# Patient Record
Sex: FEMALE | Race: WHITE
[De-identification: names, ages, dates, MRNs, and addresses within clinical notes are randomized per-mention and may not be internally consistent; named-entity substitution may affect disease eponyms.]

---

## 2019-12-17 ENCOUNTER — HOSPITAL ENCOUNTER (INPATIENT)
Dept: HOSPITAL 74 - JER | Age: 74
LOS: 5 days | Discharge: NURSING, REHAB OR LONG-TERM CARE | DRG: 552 | End: 2019-12-22
Attending: INTERNAL MEDICINE | Admitting: INTERNAL MEDICINE
Payer: COMMERCIAL

## 2019-12-17 VITALS — BODY MASS INDEX: 22.8 KG/M2

## 2019-12-17 DIAGNOSIS — M54.9: Primary | ICD-10-CM
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
## 2019-12-17 DIAGNOSIS — R29.6: ICD-10-CM

## 2019-12-17 DIAGNOSIS — N18.3: ICD-10-CM

## 2019-12-17 DIAGNOSIS — I34.0: ICD-10-CM

## 2019-12-17 DIAGNOSIS — R94.31: ICD-10-CM

## 2019-12-17 DIAGNOSIS — R42: ICD-10-CM

## 2019-12-17 DIAGNOSIS — R55: ICD-10-CM

## 2019-12-17 DIAGNOSIS — I12.9: ICD-10-CM

## 2019-12-17 DIAGNOSIS — D63.1: ICD-10-CM

## 2019-12-17 DIAGNOSIS — H91.93: ICD-10-CM

## 2019-12-17 DIAGNOSIS — F31.9: ICD-10-CM
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
## 2019-12-17 DIAGNOSIS — N17.9: ICD-10-CM

## 2019-12-17 DIAGNOSIS — J44.9: ICD-10-CM

## 2019-12-17 LAB
BASOPHILS # BLD: 0.1 % (ref 0–2)
DEPRECATED RDW RBC AUTO: 13.7 % (ref 11.6–15.6)
EOSINOPHIL # BLD: 0.5 % (ref 0–4.5)
HCT VFR BLD CALC: 25.8 % (ref 32.4–45.2)
HGB BLD-MCNC: 8.6 GM/DL (ref 10.7–15.3)
LYMPHOCYTES # BLD: 16.7 % (ref 8–40)
MCH RBC QN AUTO: 33.7 PG (ref 25.7–33.7)
MCHC RBC AUTO-ENTMCNC: 33.3 G/DL (ref 32–36)
MCV RBC: 101.2 FL (ref 80–96)
MONOCYTES # BLD AUTO: 10 % (ref 3.8–10.2)
NEUTROPHILS # BLD: 72.7 % (ref 42.8–82.8)
PLATELET # BLD AUTO: 208 K/MM3 (ref 134–434)
PMV BLD: 10.2 FL (ref 7.5–11.1)
RBC # BLD AUTO: 2.55 M/MM3 (ref 3.6–5.2)
WBC # BLD AUTO: 7.9 K/MM3 (ref 4–10)

## 2019-12-17 PROCEDURE — G0378 HOSPITAL OBSERVATION PER HR: HCPCS

## 2019-12-18 LAB
ALBUMIN SERPL-MCNC: 2.8 G/DL (ref 3.4–5)
ALBUMIN SERPL-MCNC: 2.9 G/DL (ref 3.4–5)
ALP SERPL-CCNC: 222 U/L (ref 45–117)
ALP SERPL-CCNC: 246 U/L (ref 45–117)
ALT SERPL-CCNC: 39 U/L (ref 13–61)
ALT SERPL-CCNC: 44 U/L (ref 13–61)
ANION GAP SERPL CALC-SCNC: 7 MMOL/L (ref 8–16)
ANION GAP SERPL CALC-SCNC: 9 MMOL/L (ref 8–16)
APPEARANCE UR: CLEAR
APTT BLD: 35.3 SECONDS (ref 25.2–36.5)
AST SERPL-CCNC: 81 U/L (ref 15–37)
AST SERPL-CCNC: 88 U/L (ref 15–37)
BACTERIA # UR AUTO: 0.5 /HPF
BASOPHILS # BLD: 0.4 % (ref 0–2)
BILIRUB SERPL-MCNC: 0.8 MG/DL (ref 0.2–1)
BILIRUB SERPL-MCNC: 0.8 MG/DL (ref 0.2–1)
BILIRUB UR STRIP.AUTO-MCNC: NEGATIVE MG/DL
BUN SERPL-MCNC: 29.2 MG/DL (ref 7–18)
BUN SERPL-MCNC: 30.8 MG/DL (ref 7–18)
CALCIUM SERPL-MCNC: 9.7 MG/DL (ref 8.5–10.1)
CALCIUM SERPL-MCNC: 9.7 MG/DL (ref 8.5–10.1)
CASTS URNS QL MICRO: 6 /LPF (ref 0–8)
CHLORIDE SERPL-SCNC: 105 MMOL/L (ref 98–107)
CHLORIDE SERPL-SCNC: 106 MMOL/L (ref 98–107)
CO2 SERPL-SCNC: 21 MMOL/L (ref 21–32)
CO2 SERPL-SCNC: 23 MMOL/L (ref 21–32)
COLOR UR: YELLOW
CREAT SERPL-MCNC: 3.4 MG/DL (ref 0.55–1.3)
CREAT SERPL-MCNC: 3.5 MG/DL (ref 0.55–1.3)
DEPRECATED RDW RBC AUTO: 13.2 % (ref 11.6–15.6)
EOSINOPHIL # BLD: 0.9 % (ref 0–4.5)
EPITH CASTS URNS QL MICRO: 5.3 /HPF
GGT SERPL-CCNC: 514 U/L (ref 5–85)
GLUCOSE SERPL-MCNC: 104 MG/DL (ref 74–106)
GLUCOSE SERPL-MCNC: 112 MG/DL (ref 74–106)
HCT VFR BLD CALC: 24.5 % (ref 32.4–45.2)
HGB BLD-MCNC: 8.2 GM/DL (ref 10.7–15.3)
INR BLD: 1.05 (ref 0.83–1.09)
IRON SERPL-MCNC: 62 UG/DL (ref 50–175)
KETONES UR QL STRIP: NEGATIVE
LEUKOCYTE ESTERASE UR QL STRIP.AUTO: NEGATIVE
LYMPHOCYTES # BLD: 22.3 % (ref 8–40)
MAGNESIUM SERPL-MCNC: 2.6 MG/DL (ref 1.8–2.4)
MCH RBC QN AUTO: 33.7 PG (ref 25.7–33.7)
MCHC RBC AUTO-ENTMCNC: 33.6 G/DL (ref 32–36)
MCV RBC: 100.5 FL (ref 80–96)
MONOCYTES # BLD AUTO: 11 % (ref 3.8–10.2)
NEUTROPHILS # BLD: 65.4 % (ref 42.8–82.8)
NITRITE UR QL STRIP: NEGATIVE
PH UR: 5.5 [PH] (ref 5–8)
PHOSPHATE SERPL-MCNC: 3 MG/DL (ref 2.5–4.9)
PLATELET # BLD AUTO: 181 K/MM3 (ref 134–434)
PMV BLD: 10.2 FL (ref 7.5–11.1)
POTASSIUM SERPLBLD-SCNC: 4.9 MMOL/L (ref 3.5–5.1)
POTASSIUM SERPLBLD-SCNC: 5.1 MMOL/L (ref 3.5–5.1)
PROT SERPL-MCNC: 6.2 G/DL (ref 6.4–8.2)
PROT SERPL-MCNC: 6.5 G/DL (ref 6.4–8.2)
PROT UR QL STRIP: (no result)
PROT UR QL STRIP: NEGATIVE
PT PNL PPP: 12.4 SEC (ref 9.7–13)
RBC # BLD AUTO: 1 /HPF (ref 0–4)
RBC # BLD AUTO: 2.44 M/MM3 (ref 3.6–5.2)
SODIUM SERPL-SCNC: 135 MMOL/L (ref 136–145)
SODIUM SERPL-SCNC: 136 MMOL/L (ref 136–145)
SP GR UR: 1.01 (ref 1.01–1.03)
TIBC SERPL-MCNC: 188 UG/DL (ref 250–450)
UROBILINOGEN UR STRIP-MCNC: 1 MG/DL (ref 0.2–1)
WBC # BLD AUTO: 6.6 K/MM3 (ref 4–10)
WBC # UR AUTO: 2 /HPF (ref 0–5)

## 2019-12-18 RX ADMIN — AMLODIPINE BESYLATE SCH MG: 5 TABLET ORAL at 18:53

## 2019-12-18 RX ADMIN — HEPARIN SODIUM SCH UNIT: 5000 INJECTION, SOLUTION INTRAVENOUS; SUBCUTANEOUS at 22:23

## 2019-12-18 RX ADMIN — HEPARIN SODIUM SCH UNIT: 5000 INJECTION, SOLUTION INTRAVENOUS; SUBCUTANEOUS at 18:53

## 2019-12-18 RX ADMIN — CARVEDILOL SCH MG: 12.5 TABLET, FILM COATED ORAL at 22:23

## 2019-12-18 RX ADMIN — SODIUM CHLORIDE SCH MLS/HR: 9 INJECTION, SOLUTION INTRAVENOUS at 04:49

## 2019-12-18 NOTE — PN
Physical Exam: 


SUBJECTIVE: Patient seen and examined. Unable to contribute to history as 

patient received Haldol overnight. Only mumbling words, poor historian. As per 

assisted living home patient has had multiple falls over the last two weeks 

after she was admitted there. She has been taken to [facility] for her previous 

falls and has been sent back as no acute problems were found. At baseline 

patient is able to verbalize commands, questions, and follow commands slowly. 

She is normally oriented x3. Assisted living home did not have any records 

regarding her baseline kidney function. 








OBJECTIVE:





 Vital Signs











 Period  Temp  Pulse  Resp  BP Sys/Caceres  Pulse Ox


 


 Last 24 Hr  97.1 F-97.1 F  61-68  18-20  165-166/52-74  95-97











GENERAL: somnolent, mumbling words


HEAD: Normal with no signs of trauma.


EYES: PERRL


ENT: dry mucous membranes 


NECK: Trachea midline, supple 


LUNGS: Breath sounds equal, clear to auscultation bilaterally, no wheezes, no 

crackles, no accessory muscle use. 


HEART: Regular rate and rhythm, S1, S2 without murmur, rub or gallop.


ABDOMEN: Soft, nontender, nondistended, normoactive bowel sounds, no guarding, 

no rebound


MSK: Moderate tenderness to palpation over right hip


EXTREMITIES: 2+ pulses, warm, well-perfused, no edema. 


NEUROLOGICAL: unable to asses. AAOx1. arousable to verbal and physical stimuli 


SKIN: Warm, dry. Multiple small healing scabbed over abrasions noted on lower 

and upper extremities. 








 Laboratory Results - last 24 hr











  12/17/19 12/17/19 12/17/19





  23:39 23:39 23:39


 


WBC   


 


RBC   


 


Hgb   


 


Hct   


 


MCV   


 


MCH   


 


MCHC   


 


RDW   


 


Plt Count   


 


MPV   


 


Absolute Neuts (auto)   


 


Neutrophils %   


 


Lymphocytes %   


 


Monocytes %   


 


Eosinophils %   


 


Basophils %   


 


Nucleated RBC %   


 


Retic Count   


 


PT with INR  12.40  


 


INR  1.05  


 


PTT (Actin FS)  35.3  


 


Sodium    135 L


 


Potassium    5.1


 


Chloride    105


 


Carbon Dioxide    23


 


Anion Gap    7 L


 


BUN    30.8 H


 


Creatinine    3.5 H


 


Est GFR (CKD-EPI)AfAm    14.03


 


Est GFR (CKD-EPI)NonAf    12.11


 


Random Glucose    112 H


 


Calcium    9.7


 


Phosphorus   


 


Magnesium   


 


Iron   


 


TIBC   


 


Iron Saturation   


 


Unsaturated IBC   


 


Ferritin   


 


Total Bilirubin    0.8


 


GGT   


 


AST    88 H


 


ALT    44


 


Alkaline Phosphatase    246 H


 


Creatine Kinase   


 


Creatine Kinase Index   


 


CK-MB (CK-2)   


 


Troponin I   


 


Total Protein    6.5


 


Albumin    2.9 L


 


Vitamin B12   


 


Serum Folate   


 


TSH   


 


Free T4   


 


Urine Color   


 


Urine Appearance   


 


Urine pH   


 


Ur Specific Gravity   


 


Urine Protein   


 


Urine Glucose (UA)   


 


Urine Ketones   


 


Urine Blood   


 


Urine Nitrite   


 


Urine Bilirubin   


 


Urine Urobilinogen   


 


Ur Leukocyte Esterase   


 


Urine WBC (Auto)   


 


Urine RBC (Auto)   


 


Urine Casts (Auto)   


 


U Epithel Cells (Auto)   


 


U Sm Round Cell (Auto)   


 


Urine Bacteria (Auto)   


 


Stool Occult Blood   


 


Blood Type   O POSITIVE 


 


Antibody Screen   Negative 














  12/17/19 12/17/19 12/18/19





  23:39 23:39 01:19


 


WBC  7.9  


 


RBC  2.55 L  


 


Hgb  8.6 L  


 


Hct  25.8 L  


 


MCV  101.2 H  


 


MCH  33.7  


 


MCHC  33.3  


 


RDW  13.7  


 


Plt Count  208  


 


MPV  10.2  


 


Absolute Neuts (auto)  5.8  


 


Neutrophils %  72.7  


 


Lymphocytes %  16.7  


 


Monocytes %  10.0  


 


Eosinophils %  0.5  


 


Basophils %  0.1  


 


Nucleated RBC %  0  


 


Retic Count   


 


PT with INR   


 


INR   


 


PTT (Actin FS)   


 


Sodium   


 


Potassium   


 


Chloride   


 


Carbon Dioxide   


 


Anion Gap   


 


BUN   


 


Creatinine   


 


Est GFR (CKD-EPI)AfAm   


 


Est GFR (CKD-EPI)NonAf   


 


Random Glucose   


 


Calcium   


 


Phosphorus   


 


Magnesium   


 


Iron   


 


TIBC   


 


Iron Saturation   


 


Unsaturated IBC   


 


Ferritin   


 


Total Bilirubin   


 


GGT   


 


AST   


 


ALT   


 


Alkaline Phosphatase   


 


Creatine Kinase   232 H 


 


Creatine Kinase Index   1.3 


 


CK-MB (CK-2)   3.1 


 


Troponin I   < 0.02 


 


Total Protein   


 


Albumin   


 


Vitamin B12   


 


Serum Folate   


 


TSH   


 


Free T4   


 


Urine Color   


 


Urine Appearance   


 


Urine pH   


 


Ur Specific Gravity   


 


Urine Protein   


 


Urine Glucose (UA)   


 


Urine Ketones   


 


Urine Blood   


 


Urine Nitrite   


 


Urine Bilirubin   


 


Urine Urobilinogen   


 


Ur Leukocyte Esterase   


 


Urine WBC (Auto)   


 


Urine RBC (Auto)   


 


Urine Casts (Auto)   


 


U Epithel Cells (Auto)   


 


U Sm Round Cell (Auto)   


 


Urine Bacteria (Auto)   


 


Stool Occult Blood    Negative


 


Blood Type   


 


Antibody Screen   














  12/18/19 12/18/19 12/18/19





  01:25 06:44 06:44


 


WBC    6.6


 


RBC    2.44 L


 


Hgb    8.2 L


 


Hct    24.5 L


 


MCV    100.5 H


 


MCH    33.7


 


MCHC    33.6


 


RDW    13.2


 


Plt Count    181


 


MPV    10.2


 


Absolute Neuts (auto)    4.3


 


Neutrophils %    65.4


 


Lymphocytes %    22.3  D


 


Monocytes %    11.0 H


 


Eosinophils %    0.9


 


Basophils %    0.4  D


 


Nucleated RBC %    0


 


Retic Count   


 


PT with INR   


 


INR   


 


PTT (Actin FS)   


 


Sodium   


 


Potassium   


 


Chloride   


 


Carbon Dioxide   


 


Anion Gap   


 


BUN   


 


Creatinine   


 


Est GFR (CKD-EPI)AfAm   


 


Est GFR (CKD-EPI)NonAf   


 


Random Glucose   


 


Calcium   


 


Phosphorus   


 


Magnesium   


 


Iron   


 


TIBC   


 


Iron Saturation   


 


Unsaturated IBC   


 


Ferritin   


 


Total Bilirubin   


 


GGT   


 


AST   


 


ALT   


 


Alkaline Phosphatase   


 


Creatine Kinase   


 


Creatine Kinase Index   


 


CK-MB (CK-2)   


 


Troponin I   


 


Total Protein   


 


Albumin   


 


Vitamin B12   


 


Serum Folate   


 


TSH   


 


Free T4   


 


Urine Color  Yellow  


 


Urine Appearance  Clear  


 


Urine pH  5.5  


 


Ur Specific Gravity  1.015  


 


Urine Protein  1+ H  


 


Urine Glucose (UA)  Negative  


 


Urine Ketones  Negative  


 


Urine Blood  1+ H  


 


Urine Nitrite  Negative  


 


Urine Bilirubin  Negative  


 


Urine Urobilinogen  1.0  


 


Ur Leukocyte Esterase  Negative  


 


Urine WBC (Auto)  2  


 


Urine RBC (Auto)  1  


 


Urine Casts (Auto)  6  


 


U Epithel Cells (Auto)  5.3  


 


U Sm Round Cell (Auto)  None  


 


Urine Bacteria (Auto)  0.5  


 


Stool Occult Blood   


 


Blood Type   O POSITIVE 


 


Antibody Screen   














  12/18/19 12/18/19 12/18/19





  06:44 06:44 06:44


 


WBC   


 


RBC   


 


Hgb   


 


Hct   


 


MCV   


 


MCH   


 


MCHC   


 


RDW   


 


Plt Count   


 


MPV   


 


Absolute Neuts (auto)   


 


Neutrophils %   


 


Lymphocytes %   


 


Monocytes %   


 


Eosinophils %   


 


Basophils %   


 


Nucleated RBC %   


 


Retic Count   3.33 H 


 


PT with INR   


 


INR   


 


PTT (Actin FS)   


 


Sodium  136  


 


Potassium  4.9  


 


Chloride  106  


 


Carbon Dioxide  21  


 


Anion Gap  9  


 


BUN  29.2 H  


 


Creatinine  3.4 H  


 


Est GFR (CKD-EPI)AfAm  14.53  


 


Est GFR (CKD-EPI)NonAf  12.54  


 


Random Glucose  104  


 


Calcium  9.7  


 


Phosphorus  3.0  


 


Magnesium  2.6 H  


 


Iron    62


 


TIBC    188 L


 


Iron Saturation    32


 


Unsaturated IBC    126 L


 


Ferritin    224.4


 


Total Bilirubin  0.8  


 


GGT    514 H


 


AST  81 H  


 


ALT  39  


 


Alkaline Phosphatase  222 H  


 


Creatine Kinase   


 


Creatine Kinase Index   


 


CK-MB (CK-2)   


 


Troponin I   


 


Total Protein  6.2 L  


 


Albumin  2.8 L  


 


Vitamin B12    832


 


Serum Folate  6  


 


TSH  6.56 H  


 


Free T4  1.33  


 


Urine Color   


 


Urine Appearance   


 


Urine pH   


 


Ur Specific Gravity   


 


Urine Protein   


 


Urine Glucose (UA)   


 


Urine Ketones   


 


Urine Blood   


 


Urine Nitrite   


 


Urine Bilirubin   


 


Urine Urobilinogen   


 


Ur Leukocyte Esterase   


 


Urine WBC (Auto)   


 


Urine RBC (Auto)   


 


Urine Casts (Auto)   


 


U Epithel Cells (Auto)   


 


U Sm Round Cell (Auto)   


 


Urine Bacteria (Auto)   


 


Stool Occult Blood   


 


Blood Type   


 


Antibody Screen   








Active Medications











Generic Name Dose Route Start Last Admin





  Trade Name Freq  PRN Reason Stop Dose Admin


 


Heparin Sodium (Porcine)  5,000 unit  12/18/19 13:15  





  Heparin -  SQ   





  BID MITCH   





     





     





     





     


 


Sodium Chloride  1,000 mls @ 100 mls/hr  12/18/19 04:45  12/18/19 04:49





  Normal Saline -  IV   100 mls/hr





  ASDIR MITCH   Administration





     





     





     





     











ASSESSMENT/PLAN:


75 y/o/f with PMHx of HTN, COPD, bipolar disorder, hearing loss who presented 

to the ED from [facility] for back 

pain, R hip pain and R thigh pain s/p fall with evidence of right frontal scalp 

edema. Patient has had multiple falls over the last two weeks as per assisted 

living facility. 





#Syncope vs. Fall


- Carotid U/S -  Small to moderate-size plaques at the left common carotid 

bifurcation with 50-69% stenosis,  There are also moderate-size plaques with 

calcifications at the right common carotid bifurcation bulb with 50-69% 

stenosis. 


- Echo - no significant abnormal pathology 


- Orthostatic vitals ordered


- Fall precautions


- Physical therapy 


- CT head - mild right frontal scalp edema without skull fracture or 

intracranial hemorrhage


- CT cervical spine - negative for fx. degenerative changes 


- CXR - no acute findings 


- Hip/Pelvis Xray - sclerotic density R intertrochanteric area, CT pelvis and 

lower extremity to r/o sclerotic density


- CT abd/pelvis and lower extremity - Small to moderate-size anterior pelvic 

wall hernia containing bowel loops without gross. No gross fracture or 

dislocation is identified.





#GLORIA vs CKD 3 - likely CKD 


- Renal U/S pending read 


- unable to confirm patient's baseline BUN/Cr from assisted living home, will 

attempt to contact PCP





#Hepatic Dysfunction 


- elevated AST, GGT  


- Macrocytic Anemia - B12/Folate non-deficient.


- will attempt to confirm alcohol use history once patient is more cooperative 


- Abd U/S read pending


- Hepatitis panel tests pending 





#HTN 


- continue home meds





#COPD 


- continue home meds 


- On AnoroEllipta, Albuterol PRN.





#Bipolar Disorder


- Continue Olanzapine, Lamotrigine, Fluoxetine. 





#HLD 


- Will decrease Statin dose due to elevated LFTs.





#Prophylaxis 


- Heparin





#FEN


- Sodium controlled diet 


- NS @100mls/hr





#Disposition


- admitted to Tele 








Visit type





- Emergency Visit


Emergency Visit: Yes


ED Registration Date: 12/18/19


Care time: The patient presented to the Emergency Department on the above date 

and was hospitalized for further evaluation of their emergent condition.





- New Patient


This patient is new to me today: Yes


Date on this admission: 12/18/19





- Critical Care


Critical Care patient: No





ATTENDING PHYSICIAN STATEMENT





I saw and evaluated the patient.


I reviewed the resident's note and discussed the case with the resident.


I agree with the resident's findings and plan as documented.








SUBJECTIVE:








OBJECTIVE:








ASSESSMENT AND PLAN:

## 2019-12-18 NOTE — ECHO
______________________________________________________________________________



Name: MARIA L KATZ                                   Exam:Adult Echocardiogram

MRN: [id number]         Study Date: 2019 10:32 AM

Age: 74 yrs

______________________________________________________________________________



Height: 62 in        Weight: 125 lb        BSA: 1.6 m2



______________________________________________________________________________



MMode/2D Measurements & Calculations

IVSd: 0.98 cm                                          Ao root diam: 2.0 cm

LVIDd: 3.5 cm                                          LA dimension: 2.8 cm

LVIDs: 2.5 cm

LVPWd: 0.95 cm



________________________________________________________

LVPWs: 1.1 cm                                          EDV(Teich): 50.5 ml

                                                       ESV(Teich): 22.4 ml



________________________________________________________

LVOT diam: 1.6 cm                                      LAV (MOD-bp): 76.0 ml

________________________________________________________



TAPSE: 2.0 cm

RV S Hemal: 14.8 cm/sec



Doppler Measurements & Calculations

MV V2 max: 128.2 cm/sec                                  MV E max hemal: 77.0 cm/sec

MV max P.6 mmHg                                      MV A max hemal: 52.3 cm/sec

MV V2 mean: 62.6 cm/sec                                  MV E/A: 1.5

MV mean P.9 mmHg                                     MV dec time: 0.23 sec

MV V2 VTI: 31.5 cm



__________________________________________________________

Ao V2 max: 131.4 cm/sec                                  LV V1 max P.6 mmHg

Ao max P.9 mmHg                                      LV V1 max: 80.0 cm/sec

MICHAEL(V,D): 1.2 cm2



__________________________________________________________

MR max hemal: 624.2 cm/sec                                 PA V2 max: 82.8 cm/sec

MR max P.4 mmHg                                    PA max P.7 mmHg



__________________________________________________________

Med Peak E' Hemal: 4.7 cm/sec

Med E/e': 16.5

Lat Peak E' Hemal: 7.4 cm/sec

Lat E/e': 10.4





______________________________________________________________________________

Procedure

A two-dimensional transthoracic echocardiogram with color flow and Doppler was performed. The study w
as

technically difficult with many images being suboptimal in quality.

Left Ventricle

The left ventricular size, thickness and function are normal. The left ventricular ejection fraction 
is

normal. Left Ventricular Filling pattern is normal for age. Regional wall motion abnormalities cannot
 be

excluded due to limited visualization.

Right Ventricle

The right ventricle is normal in size and function.

Atria

Normal left and right atrial size and function.

Mitral Valve

There is moderate mitral valve thickening. There is moderate mitral annular calcification. Calcified 
mitral

apparatus. There is no mitral valve stenosis. There is moderate to severe mitral regurgitation.

Tricuspid Valve

The tricuspid valve is not well visualized. There is no tricuspid stenosis. There was insufficient TR
 detected

to calculate RV systolic pressure.

Aortic Valve

The aortic valve is not well visualized. No hemodynamically significant valvular aortic stenosis. No 
aortic

regurgitation is present.

Pulmonic Valve

The pulmonic valve is not well visualized.

Great Vessels

The aortic root is normal size.

Pericardium/Pleura

There is no pericardial effusion.



______________________________________________________________________________



Interpretation Summary

The left ventricular size, thickness and function are normal

The study was technically difficult with many images being suboptimal in quality.

There is moderate to severe mitral regurgitation.

There is moderate mitral valve thickening.

There is moderate mitral annular calcification.

Calcified mitral apparatus.

There was insufficient TR detected to calculate RV systolic pressure.

The left ventricular ejection fraction is normal.

Regional wall motion abnormalities cannot be excluded due to limited visualization.

Left Ventricular Filling pattern is normal for age.





MD Chase Stone 2019 01:28 PM

## 2019-12-18 NOTE — PDOC
Documentation entered by Marcia Jiang SCRIBE, acting as scribe for Humera Troncoso MD.








Humera Troncoso MD:  This documentation has been prepared by the Apolinar peralta Xhesika, SCRIBE, under my direction and personally reviewed by me in its 

entirety.  I confirm that the documentation accurately reflects all work, 

treatment, procedures, and medical decision making performed by me.  





History of Present Illness





- General


Chief Complaint: Pain, Acute


Stated Complaint: FALL


History Source: Patient


Exam Limitations: No Limitations





- History of Present Illness


Initial Comments: 





12/17/19 22:49


The patient is a 74 year old female with a significant past medical history of  

HTN, COPD, bipolar, hearing loss, who presents to the ED from [facility] for back pain, R hip pain and R thigh pain s/p 

fall. As per NH patient was found on the floor by staff. Pt has fallen 3x in 

the past week. The patient is a poor historian and unable to contribute to 

history.  


The patient denies chest pain, shortness of breath, headache and dizziness. 

Denies fever, chills, cough, nausea, vomiting, diarrhea and constipation. 





Allergies: NKDA











Past History





- Past Medical History


Allergies/Adverse Reactions: 


 Allergies











Allergy/AdvReac Type Severity Reaction Status Date / Time


 


No Known Allergies Allergy   Verified 12/17/19 23:06











Home Medications: 


Ambulatory Orders





Albuterol Sulfate Inhaler - [Ventolin HFA Inhaler -] 1 puff IH QID 12/17/19 


Amlodipine Besylate 5 mg PO DAILY 12/17/19 


Aspirin 81 mg PO DAILY 12/17/19 


Calcitriol [Calcitriol -] 0.25 mcg PO Q2D 12/17/19 


Carvedilol [Coreg -] 12.5 mg PO BID 12/17/19 


Fluoxetine HCl 60 mg PO DAILY 12/17/19 


Lamotrigine [Lamictal] 200 mg PO DAILY 12/17/19 


Olanzapine [Zyprexa -] 5 mg PO DAILY 12/17/19 


Sodium Chloride [Nasadrops] 15 ml NS DAILY 12/17/19 


Umeclidinium Brm/Vilanterol Tr [Anoro Ellipta 62.5-25 Mcg INH] 1 each IH DAILY 

12/17/19 


Rosuvastatin [Crestor -] 5 mg PO HS #30 tablet 12/21/19 











**Review of Systems





- Review of Systems


Able to Perform ROS?: No (unable to obtain )


Is the patient limited English proficient: No





*Physical Exam





- Vital Signs


 Last Vital Signs











Temp Pulse Resp BP Pulse Ox


 


 97.1 F L  61   20   166/52 L  97 


 


 12/17/19 22:52  12/17/19 22:52  12/17/19 22:52  12/17/19 22:52  12/17/19 22:52














- Physical Exam





12/17/19 23:05


GENERAL: Awake, alert, and oriented x1


HEAD: No signs of trauma


NECK: Normal ROM, supple, no lymphadenopathy, JVD, or masses


LUNGS: Breath sounds equal, clear to auscultation bilaterally.  No wheezes, and 

no crackles


HEART: + cystolic murmur. Regular rate and rhythm, normal S1 and S2, rubs or 

gallops


ABDOMEN: Flat, Soft, nontender, normoactive bowel sounds.  No guarding, no 

rebound.  No masses


EXTREMITIES: + lumbar discomfort. No back pain. Unable to lift legs. no edema.  

No clubbing or cyanosis. No cords, erythema.


SKIN: Warm, Dry, normal turgor, no rashes or lesions noted.





Neurologic: positive: Alert (poor historian. only knows  her name )





ED Treatment Course





- LABORATORY


CBC & Chemistry Diagram: 


 12/21/19 07:15





 12/21/19 07:15





- ADDITIONAL ORDERS


Additional order review: 


 Laboratory  Results











  12/17/19 12/17/19 12/17/19





  23:39 23:39 23:39


 


PT with INR    12.40


 


INR    1.05


 


PTT (Actin FS)    35.3


 


Sodium   135 L 


 


Potassium   5.1 


 


Chloride   105 


 


Carbon Dioxide   23 


 


Anion Gap   7 L 


 


BUN   30.8 H 


 


Creatinine   3.5 H 


 


Est GFR (CKD-EPI)AfAm   14.03 


 


Est GFR (CKD-EPI)NonAf   12.11 


 


Random Glucose   112 H 


 


Calcium   9.7 


 


Total Bilirubin   0.8 


 


AST   88 H 


 


ALT   44 


 


Alkaline Phosphatase   246 H 


 


Creatine Kinase  232 H  


 


Creatine Kinase Index  1.3  


 


CK-MB (CK-2)  3.1  


 


Troponin I  < 0.02  


 


Total Protein   6.5 


 


Albumin   2.9 L 








 











  12/17/19





  23:39


 


RBC  2.55 L


 


MCV  101.2 H


 


MCHC  33.3


 


RDW  13.7


 


MPV  10.2


 


Neutrophils %  72.7


 


Lymphocytes %  16.7


 


Monocytes %  10.0


 


Eosinophils %  0.5


 


Basophils %  0.1














- RADIOLOGY


Radiology Studies Ordered: 














 Category Date Time Status


 


 CERVICAL SPINE CT W/O CONTR [CT] Stat CT Scan  12/18/19 00:01 Taken


 


 HEAD CT WITHOUT CONTRAST [CT] Stat CT Scan  12/18/19 00:01 Taken


 


 CHEST X-RAY PORTABLE* [RAD] Stat Radiology  12/18/19 00:01 Taken


 


 HIP & PELVIS-RIGHT [RAD] Stat Radiology  12/18/19 00:01 Taken














- Medications


Given in the ED: 


ED Medications














Discontinued Medications














Generic Name Dose Route Start Last Admin





  Trade Name Freq  PRN Reason Stop Dose Admin


 


Haloperidol  5 mg  12/17/19 23:24  12/17/19 23:35





  Haldol Injection (Fast Acting) -  IM  12/17/19 23:25  5 mg





  ONCE ONE   Administration





     





     





     





     


 


Lorazepam  2 mg  12/17/19 23:23  12/17/19 23:35





  Ativan Injection -  IM  12/17/19 23:24  2 mg





  ONCE ONE   Administration





     





     





     





     














Medical Decision Making





- Medical Decision Making





12/18/19 01:19








75 yo female BIBA  from [facility] after being found on the 

floor.  She is a poor historian but alert and conversant


pt had c/o right femur pain, xray ordered and no femur fracture was seen


pt is anemia and so stool hemoccult obtained And it was negative


12/18/19 01:53


CAT scan of the head  - showed a mild right frontal scalp edema without skull 

fracture or intracranial hemorrhage


CAT scan cervical spine  - is negative for fracture or  prevertebral soft 

tissue swelling, degenerative changes in C2-C4





Chest x-ray shows chronic interstitial lung disease


UA is negative


Troponin is negative


Review of her chemistry shows a creatinine of 3.5 and in her papers from 

[facility] states that she has chronic kidney disease











12/18/19 02:16





12/18/19 02:19


Plan patient was signed out to Dr. Robertson team for admission to  OBS telemetry





Discharge





- Discharge Information


Problems reviewed: Yes


Clinical Impression/Diagnosis: 


CKD (chronic kidney disease)


Qualifiers:


 Chronic kidney disease stage: unspecified stage Qualified Code(s): N18.9 - 

Chronic kidney disease, unspecified





Back pain


Qualifiers:


 Back pain location: back pain in other location Chronicity: unspecified 

Qualified Code(s): M54.89 - Other dorsalgia





HTN (hypertension)


Qualifiers:


 Hypertension type: unspecified Qualified Code(s): I10 - Essential (primary) 

hypertension





Condition: Stable


Disposition: HOME





- Follow up/Referral





- Patient Discharge Instructions





- Post Discharge Activity

## 2019-12-18 NOTE — EKG
Test Reason : 

Blood Pressure : ***/*** mmHG

Vent. Rate : 064 BPM     Atrial Rate : 064 BPM

   P-R Int : 142 ms          QRS Dur : 092 ms

    QT Int : 500 ms       P-R-T Axes : 056 007 060 degrees

   QTc Int : 515 ms

 

NORMAL SINUS RHYTHM

NONSPECIFIC ST AND T WAVE ABNORMALITY

PROLONGED QT

ABNORMAL ECG

NO PREVIOUS ECGS AVAILABLE

Confirmed by JOSEPH LOPEZ MD ([id number]) on 12/18/2019 12:26:31 PM

 

Referred By:             Confirmed By:JOSEPH LOPEZ MD

## 2019-12-18 NOTE — PN
Teaching Attending Note


Name of Resident: Andree Farr





ATTENDING PHYSICIAN STATEMENT





I saw and evaluated the patient.


I reviewed the resident's note and discussed the case with the resident.


I agree with the resident's findings and plan as documented.








SUBJECTIVE:





75 yo female BIBA  from [facility] after being found on the 

floor.  Was initially complaining of right femur pain, patient is a poor 

historian but is Awake and alert.





OBJECTIVE:


 Last Vital Signs











Temp Pulse Resp BP Pulse Ox


 


 97.1 F L  61   20   166/52 L  97 


 


 12/17/19 22:52  12/17/19 22:52  12/17/19 22:52  12/17/19 22:52  12/17/19 22:52





GENERAL:


Well developed, well nourished. Awake and alert. No acute distress.


HEENT:


Normocephalic, atraumatic. PERRLA, EOMI. No conjunctival pallor. Sclera are non-

icteric. Moist mucous membranes. Oropharynx is clear.


NECK: 


Supple. Full ROM. No JVD. Carotid pulses 2+ and symmetric, without bruits. No 

thyromegaly. No lymphadenopathy.


CARDIOVASCULAR:


Regular rate and rhythm. No murmurs, rubs, or gallops. Distal pulses are 2+ and 

symmetric. 


PULMONARY: 


No evidence of respiratory distress. Lungs clear to auscultation bilaterally. 

No wheezing, rales or rhonchi.


ABDOMINAL:


Soft. Non-tender. Non-distended. No rebound or guarding. No organomegaly. 

Normoactive bowel sounds. 


MUSCULOSKELETAL 


Normal range of motion at all joints. No bony deformities or tenderness. No CVA 

tenderness.


EXTREMITIES: 


Bilateral lower extremity edema, Exquisite tenderness to palpation of right 

thigh


SKIN: 


Warm and dry. Normal capillary refill. No rashes. No jaundice. 


PSYCHIATRIC: 


Cooperative. Good eye contact. Appropriate mood and affect.


 Abnormal Lab Results











  12/17/19 12/17/19 12/17/19





  23:39 23:39 23:39


 


RBC   2.55 L 


 


Hgb   8.6 L 


 


Hct   25.8 L 


 


MCV   101.2 H 


 


Sodium  135 L  


 


Anion Gap  7 L  


 


BUN  30.8 H  


 


Creatinine  3.5 H  


 


Random Glucose  112 H  


 


AST  88 H  


 


Alkaline Phosphatase  246 H  


 


Creatine Kinase    232 H


 


Albumin  2.9 L  


 


Urine Protein   


 


Urine Blood   














  12/18/19





  01:25


 


RBC 


 


Hgb 


 


Hct 


 


MCV 


 


Sodium 


 


Anion Gap 


 


BUN 


 


Creatinine 


 


Random Glucose 


 


AST 


 


Alkaline Phosphatase 


 


Creatine Kinase 


 


Albumin 


 


Urine Protein  1+ H


 


Urine Blood  1+ H








Imaging studies were reviewed








CT of the head  - showed a mild right frontal scalp edema without skull 

fracture or intracranial hemorrhage


CT of cervical spine  - is negative for fracture or  prevertebral soft tissue 

swelling, degenerative changes in C2-C4


Chest x-ray was reviewed by me, showed overpenetration, severe osteoporotic 

changes, possible interstitial chronic changes





ASSESSMENT AND PLAN:


74-year-old woman status post fall with right frontal scalp edema, no fractures 

appreciated suspect possible syncope. First time in this hospital.


Telemetry observation


Fall precautions


Bedrest


follow-up official imaging reads


Pain control with Tylenol


Transthoracic echo


Physical therapy evaluation


Check orthostatics


pelvic ct To rule out fracture of right femur


Lower extremity Doppler ultrasound to rule out DVT given swelling


#GLORIA versus CKDno prior renal parameters for comparison. CK was mildly 

elevated and do not suspect significant rhabdo.


Renal sonogram


Avoid nephrotoxins


I's and O's


Daily weights


IV fluid hydration


#Severe macrocytic anemia


TSH


Vitamin B12


Iron studies


Ferritin


Red blood smear


#Transaminitis


Liver sonogram


Hepatitis B and hepatitis C serologies


DVT prophylaxisSCDs

## 2019-12-18 NOTE — PN
Teaching Attending Note


Name of Resident: Sharif Early





ATTENDING PHYSICIAN STATEMENT





I saw and evaluated the patient.


I reviewed the resident's note and discussed the case with the resident.


I agree with the resident's findings and plan as documented.








SUBJECTIVE: Feels well. No chest pain/palpitations/dyspnea/lightheadedness. No 

fever/chills.








OBJECTIVE: Afebrile, Hemodynamically Stable.


 Last Vital Signs











Temp Pulse Resp BP Pulse Ox


 


 97.1 F L  68   18   165/74   95 


 


 12/18/19 06:15  12/18/19 06:15  12/18/19 06:15  12/18/19 06:15  12/18/19 06:38








HEENT - Atraumatic Normocephalic.


Heart - S1, S2 soft SM


Lungs - clear to auscultation


Abdomen- soft, non-tender. Bowel Sounds normal.


Extremities - No edema, no calf tenderness.


Neuro - AAO x 2. Tone/Power normal all extremities.


 Laboratory Results - last 24 hr











  12/17/19 12/17/19 12/17/19





  23:39 23:39 23:39


 


WBC   


 


RBC   


 


Hgb   


 


Hct   


 


MCV   


 


MCH   


 


MCHC   


 


RDW   


 


Plt Count   


 


MPV   


 


Absolute Neuts (auto)   


 


Neutrophils %   


 


Lymphocytes %   


 


Monocytes %   


 


Eosinophils %   


 


Basophils %   


 


Nucleated RBC %   


 


Retic Count   


 


PT with INR  12.40  


 


INR  1.05  


 


PTT (Actin FS)  35.3  


 


Sodium    135 L


 


Potassium    5.1


 


Chloride    105


 


Carbon Dioxide    23


 


Anion Gap    7 L


 


BUN    30.8 H


 


Creatinine    3.5 H


 


Est GFR (CKD-EPI)AfAm    14.03


 


Est GFR (CKD-EPI)NonAf    12.11


 


Random Glucose    112 H


 


Calcium    9.7


 


Phosphorus   


 


Magnesium   


 


Iron   


 


TIBC   


 


Iron Saturation   


 


Unsaturated IBC   


 


Ferritin   


 


Total Bilirubin    0.8


 


GGT   


 


AST    88 H


 


ALT    44


 


Alkaline Phosphatase    246 H


 


Creatine Kinase   


 


Creatine Kinase Index   


 


CK-MB (CK-2)   


 


Troponin I   


 


Total Protein    6.5


 


Albumin    2.9 L


 


Vitamin B12   


 


Serum Folate   


 


TSH   


 


Free T4   


 


Urine Color   


 


Urine Appearance   


 


Urine pH   


 


Ur Specific Gravity   


 


Urine Protein   


 


Urine Glucose (UA)   


 


Urine Ketones   


 


Urine Blood   


 


Urine Nitrite   


 


Urine Bilirubin   


 


Urine Urobilinogen   


 


Ur Leukocyte Esterase   


 


Urine WBC (Auto)   


 


Urine RBC (Auto)   


 


Urine Casts (Auto)   


 


U Epithel Cells (Auto)   


 


U Sm Round Cell (Auto)   


 


Urine Bacteria (Auto)   


 


Stool Occult Blood   


 


Blood Type   O POSITIVE 


 


Antibody Screen   Negative 














  12/17/19 12/17/19 12/18/19





  23:39 23:39 01:19


 


WBC  7.9  


 


RBC  2.55 L  


 


Hgb  8.6 L  


 


Hct  25.8 L  


 


MCV  101.2 H  


 


MCH  33.7  


 


MCHC  33.3  


 


RDW  13.7  


 


Plt Count  208  


 


MPV  10.2  


 


Absolute Neuts (auto)  5.8  


 


Neutrophils %  72.7  


 


Lymphocytes %  16.7  


 


Monocytes %  10.0  


 


Eosinophils %  0.5  


 


Basophils %  0.1  


 


Nucleated RBC %  0  


 


Retic Count   


 


PT with INR   


 


INR   


 


PTT (Actin FS)   


 


Sodium   


 


Potassium   


 


Chloride   


 


Carbon Dioxide   


 


Anion Gap   


 


BUN   


 


Creatinine   


 


Est GFR (CKD-EPI)AfAm   


 


Est GFR (CKD-EPI)NonAf   


 


Random Glucose   


 


Calcium   


 


Phosphorus   


 


Magnesium   


 


Iron   


 


TIBC   


 


Iron Saturation   


 


Unsaturated IBC   


 


Ferritin   


 


Total Bilirubin   


 


GGT   


 


AST   


 


ALT   


 


Alkaline Phosphatase   


 


Creatine Kinase   232 H 


 


Creatine Kinase Index   1.3 


 


CK-MB (CK-2)   3.1 


 


Troponin I   < 0.02 


 


Total Protein   


 


Albumin   


 


Vitamin B12   


 


Serum Folate   


 


TSH   


 


Free T4   


 


Urine Color   


 


Urine Appearance   


 


Urine pH   


 


Ur Specific Gravity   


 


Urine Protein   


 


Urine Glucose (UA)   


 


Urine Ketones   


 


Urine Blood   


 


Urine Nitrite   


 


Urine Bilirubin   


 


Urine Urobilinogen   


 


Ur Leukocyte Esterase   


 


Urine WBC (Auto)   


 


Urine RBC (Auto)   


 


Urine Casts (Auto)   


 


U Epithel Cells (Auto)   


 


U Sm Round Cell (Auto)   


 


Urine Bacteria (Auto)   


 


Stool Occult Blood    Negative


 


Blood Type   


 


Antibody Screen   














  12/18/19 12/18/19 12/18/19





  01:25 06:44 06:44


 


WBC    6.6


 


RBC    2.44 L


 


Hgb    8.2 L


 


Hct    24.5 L


 


MCV    100.5 H


 


MCH    33.7


 


MCHC    33.6


 


RDW    13.2


 


Plt Count    181


 


MPV    10.2


 


Absolute Neuts (auto)    4.3


 


Neutrophils %    65.4


 


Lymphocytes %    22.3  D


 


Monocytes %    11.0 H


 


Eosinophils %    0.9


 


Basophils %    0.4  D


 


Nucleated RBC %    0


 


Retic Count   


 


PT with INR   


 


INR   


 


PTT (Actin FS)   


 


Sodium   


 


Potassium   


 


Chloride   


 


Carbon Dioxide   


 


Anion Gap   


 


BUN   


 


Creatinine   


 


Est GFR (CKD-EPI)AfAm   


 


Est GFR (CKD-EPI)NonAf   


 


Random Glucose   


 


Calcium   


 


Phosphorus   


 


Magnesium   


 


Iron   


 


TIBC   


 


Iron Saturation   


 


Unsaturated IBC   


 


Ferritin   


 


Total Bilirubin   


 


GGT   


 


AST   


 


ALT   


 


Alkaline Phosphatase   


 


Creatine Kinase   


 


Creatine Kinase Index   


 


CK-MB (CK-2)   


 


Troponin I   


 


Total Protein   


 


Albumin   


 


Vitamin B12   


 


Serum Folate   


 


TSH   


 


Free T4   


 


Urine Color  Yellow  


 


Urine Appearance  Clear  


 


Urine pH  5.5  


 


Ur Specific Gravity  1.015  


 


Urine Protein  1+ H  


 


Urine Glucose (UA)  Negative  


 


Urine Ketones  Negative  


 


Urine Blood  1+ H  


 


Urine Nitrite  Negative  


 


Urine Bilirubin  Negative  


 


Urine Urobilinogen  1.0  


 


Ur Leukocyte Esterase  Negative  


 


Urine WBC (Auto)  2  


 


Urine RBC (Auto)  1  


 


Urine Casts (Auto)  6  


 


U Epithel Cells (Auto)  5.3  


 


U Sm Round Cell (Auto)  None  


 


Urine Bacteria (Auto)  0.5  


 


Stool Occult Blood   


 


Blood Type   O POSITIVE 


 


Antibody Screen   














  12/18/19 12/18/19 12/18/19





  06:44 06:44 06:44


 


WBC   


 


RBC   


 


Hgb   


 


Hct   


 


MCV   


 


MCH   


 


MCHC   


 


RDW   


 


Plt Count   


 


MPV   


 


Absolute Neuts (auto)   


 


Neutrophils %   


 


Lymphocytes %   


 


Monocytes %   


 


Eosinophils %   


 


Basophils %   


 


Nucleated RBC %   


 


Retic Count   3.33 H 


 


PT with INR   


 


INR   


 


PTT (Actin FS)   


 


Sodium  136  


 


Potassium  4.9  


 


Chloride  106  


 


Carbon Dioxide  21  


 


Anion Gap  9  


 


BUN  29.2 H  


 


Creatinine  3.4 H  


 


Est GFR (CKD-EPI)AfAm  14.53  


 


Est GFR (CKD-EPI)NonAf  12.54  


 


Random Glucose  104  


 


Calcium  9.7  


 


Phosphorus  3.0  


 


Magnesium  2.6 H  


 


Iron    62


 


TIBC    188 L


 


Iron Saturation    32


 


Unsaturated IBC    126 L


 


Ferritin    224.4


 


Total Bilirubin  0.8  


 


GGT    514 H


 


AST  81 H  


 


ALT  39  


 


Alkaline Phosphatase  222 H  


 


Creatine Kinase   


 


Creatine Kinase Index   


 


CK-MB (CK-2)   


 


Troponin I   


 


Total Protein  6.2 L  


 


Albumin  2.8 L  


 


Vitamin B12    832


 


Serum Folate  6  


 


TSH  6.56 H  


 


Free T4  1.33  


 


Urine Color   


 


Urine Appearance   


 


Urine pH   


 


Ur Specific Gravity   


 


Urine Protein   


 


Urine Glucose (UA)   


 


Urine Ketones   


 


Urine Blood   


 


Urine Nitrite   


 


Urine Bilirubin   


 


Urine Urobilinogen   


 


Ur Leukocyte Esterase   


 


Urine WBC (Auto)   


 


Urine RBC (Auto)   


 


Urine Casts (Auto)   


 


U Epithel Cells (Auto)   


 


U Sm Round Cell (Auto)   


 


Urine Bacteria (Auto)   


 


Stool Occult Blood   


 


Blood Type   


 


Antibody Screen   








 Current Medications











Generic Name Dose Route Start Last Admin





  Trade Name Freq  PRN Reason Stop Dose Admin


 


Sodium Chloride  1,000 mls @ 100 mls/hr  12/18/19 04:45  12/18/19 04:49





  Normal Saline -  IV   100 mls/hr





  ASDIR [facility]   Administration





     





     





     





     








 Home Medications











 Medication  Instructions  Recorded


 


Acetaminophen 650 mg PO QID PRN 12/17/19


 


Albuterol Sulfate Inhaler - 1 - 2 inh PO Q4H 12/17/19





[Ventolin Hfa Inhaler -]  


 


Albuterol Sulfate Inhaler - 1 puff IH QID 12/17/19





[Ventolin Hfa Inhaler -]  


 


Amlodipine Besylate 5 mg PO DAILY 12/17/19


 


Aspirin 81 mg PO DAILY 12/17/19


 


Calcitriol [Rocaltrol -] 0.25 mcg PO Q2D 12/17/19


 


Carvedilol [Coreg -] 12.5 mg PO BID 12/17/19


 


Fluoxetine HCl 60 mg PO DAILY 12/17/19


 


Lamotrigine [Lamictal] 200 mg PO DAILY 12/17/19


 


Olanzapine [Zyprexa -] 5 mg PO DAILY 12/17/19


 


Rosuvastatin Calcium [Crestor] 10 mg PO DAILY 12/17/19


 


Sodium Chloride [Nasadrops] 15 ml NS DAILY 12/17/19


 


Umeclidinium Brm/Vilanterol Tr 1 each IH DAILY 12/17/19





[Anoro Ellipta 62.5-25 Mcg INH]  











 





ASSESSMENT AND PLAN:





74 year old female resident at [facility], HTN, COPD, Bipolar, 

hearing impairment, brought to ED after being found on the floor, poor historian

, unable to recount ravinder-collapse events/symptoms, complains of R hip/leg pain. 





CT of the head  - showed a mild right frontal scalp edema without skull 

fracture or intracranial hemorrhage





CT of cervical spine  - is negative for fracture or  prevertebral soft tissue 

swelling, degenerative changes in C2-C4





CXR - no acute findings.





Hip/Pelvis Xray - sclerotic density R intertrochanteric area





1. Syncope vs Fall


ECG - NSR, QTc 515


Telemonitoring


Orthostatic Vitals


Echo


Carotid Duplex


PT





2. Sclerotic density R intertrochanteric area ?etiology


CT Pelvis to exclude fracture.





3. GLORIA vs CKD 3 - likely CKD. 


Review of external/PCP medical record pending to determine acuity/baseline 

Creat.


Renal US pending.





4. Hepatic Dysfunction (elevated AST, GGT), Macrocytic Anemia - B12/Folate non-

deficient.


?Alcohol Hx


Abdominal US and Hepatitis panel pending.





5. HTN - continue Norvasc, Coreg.





6. COPD - Stable, no evidence of deocmpensation. On AnoroEllipta, Albuterol PRN.





7. Bipolar Disorder - Continue Olanzapine, Lamotrigine, Fluoxetine. 





8. HLD - Will decrease Statin dose due to elevated LFTs.





DVT Px - Heparin SQ.

## 2019-12-19 LAB
ALBUMIN SERPL-MCNC: 2.4 G/DL (ref 3.4–5)
ALP SERPL-CCNC: 196 U/L (ref 45–117)
ALT SERPL-CCNC: 35 U/L (ref 13–61)
ANION GAP SERPL CALC-SCNC: 8 MMOL/L (ref 8–16)
AST SERPL-CCNC: 82 U/L (ref 15–37)
BASOPHILS # BLD: 0.1 % (ref 0–2)
BILIRUB SERPL-MCNC: 0.9 MG/DL (ref 0.2–1)
BUN SERPL-MCNC: 28.4 MG/DL (ref 7–18)
CALCIUM SERPL-MCNC: 8.9 MG/DL (ref 8.5–10.1)
CHLORIDE SERPL-SCNC: 111 MMOL/L (ref 98–107)
CO2 SERPL-SCNC: 18 MMOL/L (ref 21–32)
CREAT SERPL-MCNC: 3 MG/DL (ref 0.55–1.3)
DEPRECATED RDW RBC AUTO: 13.5 % (ref 11.6–15.6)
DEPRECATED RDW RBC AUTO: 13.7 % (ref 11.6–15.6)
EOSINOPHIL # BLD: 0.6 % (ref 0–4.5)
GLUCOSE SERPL-MCNC: 74 MG/DL (ref 74–106)
HCT VFR BLD CALC: 21 % (ref 32.4–45.2)
HCT VFR BLD CALC: 24.5 % (ref 32.4–45.2)
HGB BLD-MCNC: 7.1 GM/DL (ref 10.7–15.3)
HGB BLD-MCNC: 8 GM/DL (ref 10.7–15.3)
LYMPHOCYTES # BLD: 13.2 % (ref 8–40)
MAGNESIUM SERPL-MCNC: 2.3 MG/DL (ref 1.8–2.4)
MCH RBC QN AUTO: 33.5 PG (ref 25.7–33.7)
MCH RBC QN AUTO: 34.3 PG (ref 25.7–33.7)
MCHC RBC AUTO-ENTMCNC: 32.7 G/DL (ref 32–36)
MCHC RBC AUTO-ENTMCNC: 34.1 G/DL (ref 32–36)
MCV RBC: 100.6 FL (ref 80–96)
MCV RBC: 102.2 FL (ref 80–96)
MONOCYTES # BLD AUTO: 9.3 % (ref 3.8–10.2)
NEUTROPHILS # BLD: 76.8 % (ref 42.8–82.8)
PLATELET # BLD AUTO: 161 K/MM3 (ref 134–434)
PLATELET # BLD AUTO: 170 K/MM3 (ref 134–434)
PMV BLD: 10 FL (ref 7.5–11.1)
PMV BLD: 10.3 FL (ref 7.5–11.1)
POTASSIUM SERPLBLD-SCNC: 4.5 MMOL/L (ref 3.5–5.1)
PROT SERPL-MCNC: 5.4 G/DL (ref 6.4–8.2)
RBC # BLD AUTO: 2.08 M/MM3 (ref 3.6–5.2)
RBC # BLD AUTO: 2.4 M/MM3 (ref 3.6–5.2)
SODIUM SERPL-SCNC: 138 MMOL/L (ref 136–145)
WBC # BLD AUTO: 5.8 K/MM3 (ref 4–10)
WBC # BLD AUTO: 7.2 K/MM3 (ref 4–10)

## 2019-12-19 RX ADMIN — ROSUVASTATIN CALCIUM SCH MG: 5 TABLET, FILM COATED ORAL at 22:44

## 2019-12-19 RX ADMIN — CARVEDILOL SCH MG: 12.5 TABLET, FILM COATED ORAL at 22:44

## 2019-12-19 RX ADMIN — AMLODIPINE BESYLATE SCH MG: 5 TABLET ORAL at 10:14

## 2019-12-19 RX ADMIN — CARVEDILOL SCH MG: 12.5 TABLET, FILM COATED ORAL at 10:14

## 2019-12-19 RX ADMIN — SODIUM CHLORIDE SCH MLS/HR: 9 INJECTION, SOLUTION INTRAVENOUS at 06:00

## 2019-12-19 RX ADMIN — FLUOXETINE HYDROCHLORIDE SCH MG: 20 CAPSULE ORAL at 10:15

## 2019-12-19 RX ADMIN — ASPIRIN 81 MG SCH MG: 81 TABLET ORAL at 10:15

## 2019-12-19 RX ADMIN — HEPARIN SODIUM SCH UNIT: 5000 INJECTION, SOLUTION INTRAVENOUS; SUBCUTANEOUS at 10:52

## 2019-12-19 RX ADMIN — HEPARIN SODIUM SCH UNIT: 5000 INJECTION, SOLUTION INTRAVENOUS; SUBCUTANEOUS at 22:44

## 2019-12-19 RX ADMIN — UMECLIDINIUM BROMIDE AND VILANTEROL TRIFENATATE SCH PUFF: 62.5; 25 POWDER RESPIRATORY (INHALATION) at 10:51

## 2019-12-19 NOTE — PN
Progress Note (short form)





- Note


Progress Note: 





VAscular Surgery 





Carotid doppler images reviewed. 


Right ICA stenosis at best of 50%. 


No need for any surgery at this time. 


Medical management. 


Would not fix unless the lesion is greater than 80%





Gio Cassidy DO

## 2019-12-19 NOTE — PN
Teaching Attending Note


Name of Resident: Sylvain Hernandez





ATTENDING PHYSICIAN STATEMENT





I saw and evaluated the patient.


I reviewed the resident's note and discussed the case with the resident.


I agree with the resident's findings and plan as documented.








Pt seen and examined





ASSESSMENT AND PLAN:





Please see Dr. Hernandez note for details.





1) Flow cytometry an FISH PB to r/o MDS


2) Subclinical hypothyroidism may also be the cause


3) F/U outpt Hematology

## 2019-12-19 NOTE — PN
Teaching Attending Note


Name of Resident: Sharif Early





ATTENDING PHYSICIAN STATEMENT





I saw and evaluated the patient.


I reviewed the resident's note and discussed the case with the resident.


I agree with the resident's findings and plan as documented.








SUBJECTIVE: Feels well. No chest pain/palpitations/dyspnea/lightheadedness. No 

fever/chills.








OBJECTIVE: Afebrile, Hemodynamically Stable. SpO2 96% on 2L via NC


 


 Last Vital Signs











Temp Pulse Resp BP Pulse Ox


 


 98.5 F   67   20   152/57 L  96 


 


 12/19/19 06:00  12/19/19 06:00  12/19/19 06:00  12/19/19 06:00  12/18/19 21:00











HEENT - Atraumatic Normocephalic.


Heart - S1, S2 soft SM


Lungs - clear to auscultation


Abdomen - soft, non-tender. Bowel Sounds normal.


Extremities - No edema, no calf tenderness.


Neuro - AAO x 3. Tone/Power normal all extremities.





 Laboratory Results - last 24 hr











  12/18/19 12/19/19 12/19/19





  07:40 06:35 06:35


 


WBC   5.8 


 


RBC   2.08 L 


 


Hgb   7.1 L 


 


Hct   21.0 L 


 


MCV   100.6 H 


 


MCH   34.3 H 


 


MCHC   34.1 


 


RDW   13.5 


 


Plt Count   161 


 


MPV   10.0 


 


Sodium    138


 


Potassium    4.5


 


Chloride    111 H


 


Carbon Dioxide    18 L


 


Anion Gap    8


 


BUN    28.4 H


 


Creatinine    3.0 H


 


Est GFR (CKD-EPI)AfAm    16.91


 


Est GFR (CKD-EPI)NonAf    14.59


 


Random Glucose    74


 


Calcium    8.9


 


Magnesium    2.3


 


Total Bilirubin    0.9


 


AST    82 H


 


ALT    35


 


Alkaline Phosphatase    196 H


 


Total Protein    5.4 L


 


Albumin    2.4 L


 


Hep B Core IgM Ab  Negative  


 


Hepatitis Be Antigen  Negative  








 Current Medications











Generic Name Dose Route Start Last Admin





  Trade Name Freq  PRN Reason Stop Dose Admin


 


Albuterol Sulfate  2 puff  12/18/19 16:15  





  Ventolin Hfa Inhaler -  IH   





  Q4H PRN   





  SHORTNESS OF BREATH   





     





     





     


 


Amlodipine Besylate  5 mg  12/18/19 16:15  12/19/19 10:14





  Norvasc -  PO   5 mg





  DAILY MITCH   Administration





     





     





     





     


 


Aspirin  81 mg  12/19/19 10:00  12/19/19 10:15





  Asa -  PO   81 mg





  DAILY MITCH   Administration





     





     





     





     


 


Calcitriol  0.25 mcg  12/20/19 10:00  





  Rocaltrol -  PO   





  Q2D MITCH   





     





     





     





     


 


Carvedilol  12.5 mg  12/18/19 22:00  12/19/19 10:14





  Coreg -  PO   12.5 mg





  BID MITCH   Administration





     





     





     





     


 


Fluoxetine HCl  60 mg  12/19/19 10:00  12/19/19 10:15





  Prozac -  PO   60 mg





  DAILY MITCH   Administration





     





     





     





     


 


Heparin Sodium (Porcine)  5,000 unit  12/18/19 13:15  12/19/19 10:52





  Heparin -  SQ   5,000 unit





  BID MITCH   Administration





     





     





     





     


 


Sodium Chloride  1,000 mls @ 100 mls/hr  12/18/19 04:45  12/19/19 06:00





  Normal Saline -  IV   100 mls/hr





  ASDIR MITCH   Administration





     





     





     





     


 


Lamotrigine  200 mg  12/19/19 10:00  12/19/19 10:52





  Lamictal -  PO   200 mg





  DAILY MITCH   Administration





     





     





     





     


 


Olanzapine  5 mg  12/19/19 10:00  12/19/19 10:53





  Zyprexa -  PO   5 mg





  DAILY MITCH   Administration





     





     





     





     


 


Rosuvastatin Calcium  5 mg  12/19/19 22:00  





  Crestor -  PO   





  HS MITCH   





     





     





     





     


 


Umeclidinium/Vilanterol  1 puff  12/19/19 10:00  12/19/19 10:51





  Anoro Ellipta 62.5-25 Mcg Inh  IH   1 puff





  DAILY MITCH   Administration





     





     





     





     








 Home Medications











 Medication  Instructions  Recorded


 


Acetaminophen 650 mg PO QID PRN 12/17/19


 


Albuterol Sulfate Inhaler - 1 - 2 inh PO Q4H 12/17/19





[Ventolin Hfa Inhaler -]  


 


Albuterol Sulfate Inhaler - 1 puff IH QID 12/17/19





[Ventolin Hfa Inhaler -]  


 


Amlodipine Besylate 5 mg PO DAILY 12/17/19


 


Aspirin 81 mg PO DAILY 12/17/19


 


Calcitriol [Rocaltrol -] 0.25 mcg PO Q2D 12/17/19


 


Carvedilol [Coreg -] 12.5 mg PO BID 12/17/19


 


Fluoxetine HCl 60 mg PO DAILY 12/17/19


 


Lamotrigine [Lamictal] 200 mg PO DAILY 12/17/19


 


Olanzapine [Zyprexa -] 5 mg PO DAILY 12/17/19


 


Rosuvastatin Calcium [Crestor] 10 mg PO DAILY 12/17/19


 


Sodium Chloride [Nasadrops] 15 ml NS DAILY 12/17/19


 


Umeclidinium Brm/Vilanterol Tr 1 each IH DAILY 12/17/19





[Anoro Ellipta 62.5-25 Mcg INH]  











 





ASSESSMENT AND PLAN:





74 year old female resident at [facility], HTN, COPD, Bipolar, 

hearing impairment, brought to ED after being found on the floor, poor historian

, unable to recount ravinder-collapse events/symptoms, complains of R hip/leg pain. 





CT of the head  - showed a mild right frontal scalp edema without skull 

fracture or intracranial hemorrhage





CT of cervical spine  - is negative for fracture or  prevertebral soft tissue 

swelling, degenerative changes in C2-C4





CXR - no acute findings.





Hip/Pelvis Xray - sclerotic density R intertrochanteric area





1. Syncope vs Fall


ECG - NSR, QTc 515 ---> 491


Echo - LV EF Normal, severe MR.


Carotid Duplex - shows some narrowing of carotids - evaluated by Vascular Sx - 

no need for intervention as narrowing < 50%.


Orthostatic Vitals pending


PT - may need placement if needs more than what can be provided at [facility].





2. Sclerotic density R intertrochanteric area on XRay - not visualized on CT 

Pelvis/LEs. 





3. GLORIA on CKD 3 - Creat improving. 


Attempting to obtain medical records to determine baseline renal function.


Renal US - Atrophic echogenic kidneys, L Renal cyst.





4. Hepatic Dysfunction (elevated AST, GGT), Macrocytic Anemia - B12/Folate non-

deficient.


Abdominal US - fatty liver. Hepatitis panel pending.





5. HTN - continue Norvasc, Coreg.





6. COPD - Stable, no evidence of decompensation. On AnoroEllipta, Albuterol PRN.





7. Bipolar Disorder - Continue Olanzapine, Lamotrigine, Fluoxetine. 





8. HLD - Will decrease Statin dose due to elevated LFTs.





9. Macrocytic Anemia - etiology unclear. B12/folate non-deficient. ?sec to 

Liver disease. Iron 62/Ferritin 224/FOBT negative. Will consult Hematology for 

further Ix/work-up.





DVT Px - Heparin SQ.

## 2019-12-19 NOTE — PN
Physical Exam: 


SUBJECTIVE: Patient seen and examined. Patient much more awake today. AAOx3. 

Asking for a cigarette. Patient unsure why she was brought to the hospital. 

Denies any pain. Denies dizziness, chest pain, SOB, abd pain. States she has a 

history of kidney disease. 








OBJECTIVE:





 Vital Signs











 Period  Temp  Pulse  Resp  BP Sys/Caceres  Pulse Ox


 


 Last 24 Hr  98 F-99.5 F  61-69  18-20  122-156/40-74  96-96











GENERAL: awake, alert, fully oriented 


HEAD: Normal with no signs of trauma.


EYES: PERRL, EOMI


ENT: dry mucous membranes 


NECK: Trachea midline, supple 


LUNGS: mild expiratory wheezing at the bases, no crackles, no accessory muscle 

use. 


HEART: RRR, S1, S2 normal


ABDOMEN: Soft, nontender, nondistended, normoactive bowel sounds, no guarding, 

no rebound


MSK: Moderate tenderness to palpation over right hip/femur. 


EXTREMITIES: 2+ pulses, warm, well-perfused, no edema. 


NEUROLOGICAL: Cranial nerves 2-12 intact. Normal sensation throughout. AAOx3


SKIN: Warm, dry. Multiple small healing scabbed over abrasions noted on lower 

and upper extremities. 














 Laboratory Results - last 24 hr











  12/18/19 12/19/19 12/19/19





  07:40 06:35 06:35


 


WBC   5.8 


 


RBC   2.08 L 


 


Hgb   7.1 L 


 


Hct   21.0 L 


 


MCV   100.6 H 


 


MCH   34.3 H 


 


MCHC   34.1 


 


RDW   13.5 


 


Plt Count   161 


 


MPV   10.0 


 


Absolute Neuts (auto)   


 


Neutrophils %   


 


Lymphocytes %   


 


Monocytes %   


 


Eosinophils %   


 


Basophils %   


 


Nucleated RBC %   


 


Sodium    138


 


Potassium    4.5


 


Chloride    111 H


 


Carbon Dioxide    18 L


 


Anion Gap    8


 


BUN    28.4 H


 


Creatinine    3.0 H


 


Est GFR (CKD-EPI)AfAm    16.91


 


Est GFR (CKD-EPI)NonAf    14.59


 


Random Glucose    74


 


Calcium    8.9


 


Magnesium    2.3


 


Total Bilirubin    0.9


 


AST    82 H


 


ALT    35


 


Alkaline Phosphatase    196 H


 


Total Protein    5.4 L


 


Albumin    2.4 L


 


Hep B Core IgM Ab  Negative  


 


Hepatitis Be Antigen  Negative  














  12/19/19





  12:20


 


WBC  7.2


 


RBC  2.40 L


 


Hgb  8.0 L


 


Hct  24.5 L D


 


MCV  102.2 H


 


MCH  33.5


 


MCHC  32.7


 


RDW  13.7


 


Plt Count  170


 


MPV  10.3


 


Absolute Neuts (auto)  5.5


 


Neutrophils %  76.8


 


Lymphocytes %  13.2  D


 


Monocytes %  9.3


 


Eosinophils %  0.6


 


Basophils %  0.1


 


Nucleated RBC %  0


 


Sodium 


 


Potassium 


 


Chloride 


 


Carbon Dioxide 


 


Anion Gap 


 


BUN 


 


Creatinine 


 


Est GFR (CKD-EPI)AfAm 


 


Est GFR (CKD-EPI)NonAf 


 


Random Glucose 


 


Calcium 


 


Magnesium 


 


Total Bilirubin 


 


AST 


 


ALT 


 


Alkaline Phosphatase 


 


Total Protein 


 


Albumin 


 


Hep B Core IgM Ab 


 


Hepatitis Be Antigen 








Active Medications











Generic Name Dose Route Start Last Admin





  Trade Name Freq  PRN Reason Stop Dose Admin


 


Albuterol Sulfate  2 puff  12/18/19 16:15  





  Ventolin Hfa Inhaler -  IH   





  Q4H PRN   





  SHORTNESS OF BREATH   





     





     





     


 


Amlodipine Besylate  5 mg  12/18/19 16:15  12/19/19 10:14





  Norvasc -  PO   5 mg





  DAILY MITCH   Administration





     





     





     





     


 


Aspirin  81 mg  12/19/19 10:00  12/19/19 10:15





  Asa -  PO   81 mg





  DAILY MITCH   Administration





     





     





     





     


 


Calcitriol  0.25 mcg  12/20/19 10:00  





  Rocaltrol -  PO   





  Q2D MITCH   





     





     





     





     


 


Carvedilol  12.5 mg  12/18/19 22:00  12/19/19 10:14





  Coreg -  PO   12.5 mg





  BID MITCH   Administration





     





     





     





     


 


Fluoxetine HCl  60 mg  12/19/19 10:00  12/19/19 10:15





  Prozac -  PO   60 mg





  DAILY MITCH   Administration





     





     





     





     


 


Heparin Sodium (Porcine)  5,000 unit  12/18/19 13:15  12/19/19 10:52





  Heparin -  SQ   5,000 unit





  BID MITCH   Administration





     





     





     





     


 


Sodium Chloride  1,000 mls @ 100 mls/hr  12/18/19 04:45  12/19/19 06:00





  Normal Saline -  IV   100 mls/hr





  ASDIR MITCH   Administration





     





     





     





     


 


Lamotrigine  200 mg  12/19/19 10:00  12/19/19 10:52





  Lamictal -  PO   200 mg





  DAILY MITCH   Administration





     





     





     





     


 


Olanzapine  5 mg  12/19/19 10:00  12/19/19 10:53





  Zyprexa -  PO   5 mg





  DAILY MITCH   Administration





     





     





     





     


 


Rosuvastatin Calcium  5 mg  12/19/19 22:00  





  Crestor -  PO   





  HS MITCH   





     





     





     





     


 


Umeclidinium/Vilanterol  1 puff  12/19/19 10:00  12/19/19 10:51





  Anoro Ellipta 62.5-25 Mcg Inh  IH   1 puff





  DAILY MITCH   Administration





     





     





     





     











ASSESSMENT/PLAN:


75 y/o/f with PMHx of HTN, COPD, bipolar disorder, hearing loss who presented 

to the ED from [facility] for back 

pain, R hip pain and R thigh pain s/p fall with evidence of right frontal scalp 

edema. Patient has had multiple falls over the last two weeks as per assisted 

living facility. 





#Syncope vs. Fall


- Carotid U/S -  Small to moderate-size plaques at the left common carotid 

bifurcation with 50-69% stenosis,  There are also moderate-size plaques with 

calcifications at the right common carotid bifurcation bulb with 50-69% 

stenosis. 


- Vascular consulted for Carotid U/S findings 


   - no need for surgery at this time. Continue medical management. Would not 

fix unless the lesion is greater than 80% as per surg


- Echo - no significant abnormal pathology 


- Orthostatic vitals negative 


- Fall precautions


- Physical therapy 


- CT head - mild right frontal scalp edema without skull fracture or 

intracranial hemorrhage


- CT cervical spine - negative for fx. degenerative changes 


- CXR - no acute findings 


- Hip/Pelvis Xray - sclerotic density R intertrochanteric area, CT pelvis and 

lower extremity to r/o sclerotic density


- CT abd/pelvis and lower extremity - Small to moderate-size anterior pelvic 

wall hernia containing bowel loops without gross. No gross fracture or 

dislocation is identified.





#GLORIA vs CKD 3 - likely CKD 


- Renal U/S - atrophic + echogenic kidneys consistent with medical renal 

disease 


- unable to confirm patient's baseline BUN/Cr from assisted living home, unable 

to contact PCP





#Hepatic Dysfunction 


- elevated AST, GGT  


- Macrocytic Anemia - B12/Folate non-deficient


- Heme Onc consulted for Macrocytic anemia. Appreciate recs


- Abd U/S - atrophic kidneys, trace perihepatic fluid, minimal gallbladder wall 

thickening 


- Hepatitis panel tests pending 





#HTN 


- continue home meds





#COPD 


- continue home meds 


- On AnoroEllipta, Albuterol PRN.





#Bipolar Disorder


- Continue Olanzapine, Lamotrigine, Fluoxetine. 





#HLD 


- Decreased statin dose due to increased LFTs





#Prophylaxis 


- Heparin





#FEN


- Sodium controlled diet 


- NS @100mls/hr





#Disposition


- admitted to Tele 





Visit type





- Emergency Visit


Emergency Visit: Yes


ED Registration Date: 12/18/19


Care time: The patient presented to the Emergency Department on the above date 

and was hospitalized for further evaluation of their emergent condition.





- New Patient


This patient is new to me today: No





- Critical Care


Critical Care patient: No





ATTENDING PHYSICIAN STATEMENT





I saw and evaluated the patient.


I reviewed the resident's note and discussed the case with the resident.


I agree with the resident's findings and plan as documented.








SUBJECTIVE:








OBJECTIVE:








ASSESSMENT AND PLAN:

## 2019-12-19 NOTE — CON.CARD
Consult


Consult Specialty:: Cardiology


Referred by:: Medicine


Reason for Consultation:: mitral regurgitation, prolonged QTc





- History of Present Illness


Chief Complaint: fall


History of Present Illness: 


74F h/o HTN, COPD, bipolar d/o p/w fall, back pain, hip pain.  Fell three times 

a week prior to admission.  Patient refusing to answer most questions, says she 

is fine and wants to go home.  no chest pain, palps, dyspnea, dizziness

















- Alcohol/Substance Use


Hx Alcohol Use: No





- Smoking History


Smoking history: Never smoked


Have you smoked in the past 12 months: No





Home Medications





- Allergies


Allergies/Adverse Reactions: 


 Allergies











Allergy/AdvReac Type Severity Reaction Status Date / Time


 


No Known Allergies Allergy   Verified 12/17/19 23:06














- Home Medications


Home Medications: 


Ambulatory Orders





Acetaminophen 650 mg PO QID PRN 12/17/19 


Albuterol Sulfate Inhaler - [Ventolin Hfa Inhaler -] 1 - 2 inh PO Q4H 12/17/19 


Albuterol Sulfate Inhaler - [Ventolin Hfa Inhaler -] 1 puff IH QID 12/17/19 


Amlodipine Besylate 5 mg PO DAILY 12/17/19 


Aspirin 81 mg PO DAILY 12/17/19 


Calcitriol [Rocaltrol -] 0.25 mcg PO Q2D 12/17/19 


Carvedilol [Coreg -] 12.5 mg PO BID 12/17/19 


Fluoxetine HCl 60 mg PO DAILY 12/17/19 


Lamotrigine [Lamictal] 200 mg PO DAILY 12/17/19 


Olanzapine [Zyprexa -] 5 mg PO DAILY 12/17/19 


Rosuvastatin Calcium [Crestor] 10 mg PO DAILY 12/17/19 


Sodium Chloride [Nasadrops] 15 ml NS DAILY 12/17/19 


Umeclidinium Brm/Vilanterol Tr [Anoro Ellipta 62.5-25 Mcg INH] 1 each IH DAILY 

12/17/19 











Family Medical History


Family History: Unable to Obtain





Review of Systems


Unable to obtain ROS, reason: noncompliant with history


Vital Signs: 


 Vital Signs











Temperature  98 F   12/19/19 10:00


 


Pulse Rate  58 L  12/19/19 14:00


 


Respiratory Rate  20   12/19/19 14:00


 


Blood Pressure  129/50 L  12/19/19 14:00


 


O2 Sat by Pulse Oximetry (%)  96   12/19/19 09:00











Constitutional: Yes: No Distress, Calm


Eyes: Yes: Conjunctiva Clear, EOM Intact


HENT: Yes: Atraumatic, Normocephalic


Neck: Yes: Supple, Trachea Midline


Respiratory: Yes: Regular, CTA Bilaterally


Gastrointestinal: Yes: Normal Bowel Sounds, Soft


Cardiovascular: Yes: Regular Rate and Rhythm


JVD: No


Heart Sounds: Yes: S1, S2


Edema: No


Peripheral Pulses WNL: No (thrill LUE)


Integumentary: No: Jaundice


Neurological: Yes: Alert, Oriented


Psychiatric: No: Agitated





- Other Data


Labs, Other Data: 


 CBC, BMP





 12/19/19 12:20 





 12/19/19 06:35 





 INR, PTT











INR  1.05  (0.83-1.09)   12/17/19  23:39    














Assessment/Plan





EKG: sinus, prolonged QTc 491 ms, no ischemic changes





echo 12/2019 tds, nl LV function,mod to severe MR, mod MAC, RWMA cannot be 

excluded, RV nl





tele; sinus





fall, syncope


- pt not providing further hx


- no events on tele


- carotid stenosis on doppler, <50% - no intervention per vascular


- check orthostatics


- trop neg, no ischemic changes on EKG





prolonged QTc


- initially 515 ms, now improved 491 ms


- avoid QT prolonging agents


- maintain K>4, Mg >2





mitral regurgitation


- moderate to severe MR on echo


- denies chest pain, dyspnea


- outpatient follow up





CKD


- per pt had dialysis access placed in the past, but no history of HD


- monitor Cr


 


HTN


- stable on current meds, continue





bipolar disorder


- manage per primary





anemia


- heme consulted

## 2019-12-20 LAB
ALBUMIN SERPL-MCNC: 2.5 G/DL (ref 3.4–5)
ALP SERPL-CCNC: 210 U/L (ref 45–117)
ALT SERPL-CCNC: 41 U/L (ref 13–61)
ANION GAP SERPL CALC-SCNC: 7 MMOL/L (ref 8–16)
AST SERPL-CCNC: 101 U/L (ref 15–37)
BILIRUB SERPL-MCNC: 0.9 MG/DL (ref 0.2–1)
BUN SERPL-MCNC: 24 MG/DL (ref 7–18)
CALCIUM SERPL-MCNC: 9 MG/DL (ref 8.5–10.1)
CHLORIDE SERPL-SCNC: 111 MMOL/L (ref 98–107)
CO2 SERPL-SCNC: 19 MMOL/L (ref 21–32)
CREAT SERPL-MCNC: 3 MG/DL (ref 0.55–1.3)
DEPRECATED RDW RBC AUTO: 13.4 % (ref 11.6–15.6)
GLUCOSE SERPL-MCNC: 97 MG/DL (ref 74–106)
HCT VFR BLD CALC: 22.5 % (ref 32.4–45.2)
HGB BLD-MCNC: 7.5 GM/DL (ref 10.7–15.3)
MCH RBC QN AUTO: 33.8 PG (ref 25.7–33.7)
MCHC RBC AUTO-ENTMCNC: 33.4 G/DL (ref 32–36)
MCV RBC: 101.2 FL (ref 80–96)
PLATELET # BLD AUTO: 174 K/MM3 (ref 134–434)
PMV BLD: 10.1 FL (ref 7.5–11.1)
POTASSIUM SERPLBLD-SCNC: 4.8 MMOL/L (ref 3.5–5.1)
PROT SERPL-MCNC: 5.9 G/DL (ref 6.4–8.2)
RBC # BLD AUTO: 2.22 M/MM3 (ref 3.6–5.2)
SODIUM SERPL-SCNC: 137 MMOL/L (ref 136–145)
WBC # BLD AUTO: 6.9 K/MM3 (ref 4–10)

## 2019-12-20 RX ADMIN — FLUOXETINE HYDROCHLORIDE SCH MG: 20 CAPSULE ORAL at 12:01

## 2019-12-20 RX ADMIN — HEPARIN SODIUM SCH UNIT: 5000 INJECTION, SOLUTION INTRAVENOUS; SUBCUTANEOUS at 12:00

## 2019-12-20 RX ADMIN — CALCITRIOL SCH MCG: 0.25 CAPSULE ORAL at 12:00

## 2019-12-20 RX ADMIN — ROSUVASTATIN CALCIUM SCH MG: 5 TABLET, FILM COATED ORAL at 22:19

## 2019-12-20 RX ADMIN — AMLODIPINE BESYLATE SCH MG: 5 TABLET ORAL at 12:01

## 2019-12-20 RX ADMIN — ASPIRIN 81 MG SCH MG: 81 TABLET ORAL at 12:00

## 2019-12-20 RX ADMIN — SODIUM CHLORIDE SCH MLS/HR: 9 INJECTION, SOLUTION INTRAVENOUS at 11:48

## 2019-12-20 RX ADMIN — HEPARIN SODIUM SCH UNIT: 5000 INJECTION, SOLUTION INTRAVENOUS; SUBCUTANEOUS at 22:19

## 2019-12-20 RX ADMIN — UMECLIDINIUM BROMIDE AND VILANTEROL TRIFENATATE SCH PUFF: 62.5; 25 POWDER RESPIRATORY (INHALATION) at 12:01

## 2019-12-20 RX ADMIN — CARVEDILOL SCH MG: 12.5 TABLET, FILM COATED ORAL at 22:19

## 2019-12-20 RX ADMIN — CARVEDILOL SCH MG: 12.5 TABLET, FILM COATED ORAL at 12:00

## 2019-12-20 NOTE — PN
Progress Note, Physician


Chief Complaint: 





denies CP or SOB


TELE: NSR


History of Present Illness: 





Mitral regurgitation





- Current Medication List


Current Medications: 


Active Medications





Albuterol Sulfate (Ventolin Hfa Inhaler -)  2 puff IH Q4H PRN


   PRN Reason: SHORTNESS OF BREATH


Amlodipine Besylate (Norvasc -)  5 mg PO DAILY [facility]


   Last Admin: 12/19/19 10:14 Dose:  5 mg


Aspirin (Asa -)  81 mg PO DAILY [facility]


   Last Admin: 12/19/19 10:15 Dose:  81 mg


Calcitriol (Rocaltrol -)  0.25 mcg PO Q2D [facility]


Carvedilol (Coreg -)  12.5 mg PO BID [facility]


   Last Admin: 12/19/19 22:44 Dose:  12.5 mg


Fluoxetine HCl (Prozac -)  60 mg PO DAILY [facility]


   Last Admin: 12/19/19 10:15 Dose:  60 mg


Heparin Sodium (Porcine) (Heparin -)  5,000 unit SQ BID [facility]


   Last Admin: 12/19/19 22:44 Dose:  5,000 unit


Sodium Chloride (Normal Saline -)  1,000 mls @ 100 mls/hr IV ASDIR [facility]


   Last Admin: 12/19/19 06:00 Dose:  100 mls/hr


Lamotrigine (Lamictal -)  200 mg PO DAILY [facility]


   Last Admin: 12/19/19 10:52 Dose:  200 mg


Olanzapine (Zyprexa -)  5 mg PO DAILY [facility]


   Last Admin: 12/19/19 10:53 Dose:  5 mg


Rosuvastatin Calcium (Crestor -)  5 mg PO HS [facility]


   Last Admin: 12/19/19 22:44 Dose:  5 mg


Umeclidinium/Vilanterol (Anoro Ellipta 62.5-25 Mcg Inh)  1 puff IH DAILY [facility]


   Last Admin: 12/19/19 10:51 Dose:  1 puff











- Objective


Vital Signs: 


 Vital Signs











Temperature  97.9 F   12/20/19 01:00


 


Pulse Rate  68   12/20/19 05:00


 


Respiratory Rate  20   12/20/19 05:00


 


Blood Pressure  137/56 L  12/20/19 05:00


 


O2 Sat by Pulse Oximetry (%)  91 L  12/19/19 20:57











Constitutional: Yes: No Distress


Cardiovascular: Yes: Regular Rate and Rhythm, Murmur


Respiratory: Yes: CTA Bilaterally


Gastrointestinal: Yes: Soft (NT)


Edema: No


Neurological: Yes: Alert


Labs: 


 CBC, BMP





 12/20/19 06:19 





 12/20/19 06:19 





 INR, PTT











INR  1.05  (0.83-1.09)   12/17/19  23:39    








 Laboratory Tests











  12/18/19





  01:19


 


Stool Occult Blood  Negative














Assessment/Plan





echo 12/2019 tds, nl LV function,mod to severe MR, mod MAC, RWMA cannot be 

excluded, RV nl





tele; sinus





fall, syncope


- pt not providing further hx


- no events on tele


- carotid stenosis on doppler, <50% - no intervention per vascular


- check orthostatics


- trop neg, no ischemic changes on EKG





prolonged QTc


- initially 515 ms, now improved 491 ms


- avoid QT prolonging agents


- maintain K>4, Mg >2





mitral regurgitation


- moderate to severe MR on echo


- denies chest pain, dyspnea


- outpatient follow up





CKD


- per pt had dialysis access placed in the past, but no history of HD


- monitor Cr as per PMD 


 


HTN


- stable on current meds, continue





bipolar disorder


- manage per primary





anemia


- heme consulted, as per Heme and PMD

## 2019-12-20 NOTE — PN
Teaching Attending Note


Name of Resident: Sharif Early





ATTENDING PHYSICIAN STATEMENT





I saw and evaluated the patient.


I reviewed the resident's note and discussed the case with the resident.


I agree with the resident's findings and plan as documented.








SUBJECTIVE: Feels well. No chest pain/palpitations/dyspnea/lightheadedness. No 

fever/chills. Wants to return home to AL facility.








OBJECTIVE: Afebrile, Hemodynamically Stable. SpO2 96% on 2L via NC. 





 Last Vital Signs











Temp Pulse Resp BP Pulse Ox


 


 97.9 F   68   20   137/56 L  91 L


 


 12/20/19 01:00  12/20/19 05:00  12/20/19 05:00  12/20/19 05:00  12/19/19 20:57











 


Heart - S1, S2 soft SM


Lungs - clear to auscultation


Abdomen - soft, non-tender. Bowel Sounds normal.


Extremities - No edema, no calf tenderness.


Neuro - AAO x 2. Tone/Power normal all extremities.





 Laboratory Results - last 24 hr











  12/18/19 12/18/19 12/20/19





  07:40 07:40 06:19


 


WBC    6.9


 


RBC    2.22 L


 


Hgb    7.5 L


 


Hct    22.5 L


 


MCV    101.2 H


 


MCH    33.8 H


 


MCHC    33.4


 


RDW    13.4


 


Plt Count    174


 


MPV    10.1


 


Sodium   


 


Potassium   


 


Chloride   


 


Carbon Dioxide   


 


Anion Gap   


 


BUN   


 


Creatinine   


 


Est GFR (CKD-EPI)AfAm   


 


Est GFR (CKD-EPI)NonAf   


 


Random Glucose   


 


Calcium   


 


Total Bilirubin   


 


AST   


 


ALT   


 


Alkaline Phosphatase   


 


Total Protein   


 


Albumin   


 


Hep A IgM Ab Confirm  Negative  


 


Hepatitis A Ab Total  Negative  


 


Hep Bs Antigen  Negative  


 


Hep Bs Antibody  Non reactive  


 


Hep B Core Total Ab  Negative  


 


Hep B Core IgM Ab  Negative  


 


Hepatitis Be Antibody  Negative  Negative 


 


Hepatitis Be Antigen  Negative  














  12/20/19





  06:19


 


WBC 


 


RBC 


 


Hgb 


 


Hct 


 


MCV 


 


MCH 


 


MCHC 


 


RDW 


 


Plt Count 


 


MPV 


 


Sodium  137


 


Potassium  4.8


 


Chloride  111 H


 


Carbon Dioxide  19 L


 


Anion Gap  7 L


 


BUN  24.0 H


 


Creatinine  3.0 H


 


Est GFR (CKD-EPI)AfAm  16.91


 


Est GFR (CKD-EPI)NonAf  14.59


 


Random Glucose  97


 


Calcium  9.0


 


Total Bilirubin  0.9


 


AST  101 H


 


ALT  41


 


Alkaline Phosphatase  210 H


 


Total Protein  5.9 L


 


Albumin  2.5 L


 


Hep A IgM Ab Confirm 


 


Hepatitis A Ab Total 


 


Hep Bs Antigen 


 


Hep Bs Antibody 


 


Hep B Core Total Ab 


 


Hep B Core IgM Ab 


 


Hepatitis Be Antibody 


 


Hepatitis Be Antigen 








 Current Medications











Generic Name Dose Route Start Last Admin





  Trade Name Freq  PRN Reason Stop Dose Admin


 


Albuterol Sulfate  2 puff  12/18/19 16:15  





  Ventolin Hfa Inhaler -  IH   





  Q4H PRN   





  SHORTNESS OF BREATH   





     





     





     


 


Amlodipine Besylate  5 mg  12/18/19 16:15  12/20/19 12:01





  Norvasc -  PO   5 mg





  DAILY MITCH   Administration





     





     





     





     


 


Aspirin  81 mg  12/19/19 10:00  12/20/19 12:00





  Asa -  PO   81 mg





  DAILY MITCH   Administration





     





     





     





     


 


Calcitriol  0.25 mcg  12/20/19 10:00  12/20/19 12:00





  Rocaltrol -  PO   0.25 mcg





  Q2D MITCH   Administration





     





     





     





     


 


Carvedilol  12.5 mg  12/18/19 22:00  12/20/19 12:00





  Coreg -  PO   12.5 mg





  BID MITCH   Administration





     





     





     





     


 


Fluoxetine HCl  60 mg  12/19/19 10:00  12/20/19 12:01





  Prozac -  PO   60 mg





  DAILY MITCH   Administration





     





     





     





     


 


Heparin Sodium (Porcine)  5,000 unit  12/18/19 13:15  12/20/19 12:00





  Heparin -  SQ   5,000 unit





  BID MITCH   Administration





     





     





     





     


 


Sodium Chloride  1,000 mls @ 100 mls/hr  12/18/19 04:45  12/20/19 11:48





  Normal Saline -  IV   100 mls/hr





  ASDIR MITCH   Administration





     





     





     





     


 


Lamotrigine  200 mg  12/19/19 10:00  12/20/19 12:00





  Lamictal -  PO   200 mg





  DAILY MITCH   Administration





     





     





     





     


 


Olanzapine  5 mg  12/19/19 10:00  12/20/19 12:00





  Zyprexa -  PO   5 mg





  DAILY MITCH   Administration





     





     





     





     


 


Rosuvastatin Calcium  5 mg  12/19/19 22:00  12/19/19 22:44





  Crestor -  PO   5 mg





  HS MITCH   Administration





     





     





     





     


 


Umeclidinium/Vilanterol  1 puff  12/19/19 10:00  12/20/19 12:01





  Anoro Ellipta 62.5-25 Mcg Inh  IH   1 puff





  DAILY MITCH   Administration





     





     





     





     








 Home Medications











 Medication  Instructions  Recorded


 


Acetaminophen 650 mg PO QID PRN 12/17/19


 


Albuterol Sulfate Inhaler - 1 - 2 inh PO Q4H 12/17/19





[Ventolin Hfa Inhaler -]  


 


Albuterol Sulfate Inhaler - 1 puff IH QID 12/17/19





[Ventolin Hfa Inhaler -]  


 


Amlodipine Besylate 5 mg PO DAILY 12/17/19


 


Aspirin 81 mg PO DAILY 12/17/19


 


Calcitriol [Rocaltrol -] 0.25 mcg PO Q2D 12/17/19


 


Carvedilol [Coreg -] 12.5 mg PO BID 12/17/19


 


Fluoxetine HCl 60 mg PO DAILY 12/17/19


 


Lamotrigine [Lamictal] 200 mg PO DAILY 12/17/19


 


Olanzapine [Zyprexa -] 5 mg PO DAILY 12/17/19


 


Rosuvastatin Calcium [Crestor] 10 mg PO DAILY 12/17/19


 


Sodium Chloride [Nasadrops] 15 ml NS DAILY 12/17/19


 


Umeclidinium Brm/Vilanterol Tr 1 each IH DAILY 12/17/19





[Anoro Ellipta 62.5-25 Mcg INH]  














 





ASSESSMENT AND PLAN:





74 year old female resident at [facility], HTN, COPD, Bipolar, 

hearing impairment, brought to ED after being found on the floor, poor historian

, unable to recount ravinder-collapse events/symptoms, complains of R hip/leg pain. 





CT of the head  - showed a mild right frontal scalp edema without skull 

fracture or intracranial hemorrhage





CT of cervical spine  - is negative for fracture or  prevertebral soft tissue 

swelling, degenerative changes in C2-C4





CXR - no acute findings.





Hip/Pelvis Xray - sclerotic density R intertrochanteric area





1. Syncope vs Fall


ECG - NSR, QTc 515 ---> 491


Echo - LV EF Normal, severe MR.


Carotid Duplex - shows some narrowing of carotids - evaluated by Vascular Sx - 

no need for intervention as narrowing < 50%.


Orthostatic Vitals negative


PT - may need placement if needs more than what can be provided at [facility]. Awaiting word from AL facility.





2. Sclerotic density R intertrochanteric area on XRay - not visualized on CT 

Pelvis/LEs. 





3. GLORIA on CKD 3 - Creat improving, at baseline currently.


Renal US - Atrophic echogenic kidneys, L Renal cyst.





4. Hepatic Dysfunction (elevated AST, GGT)


Abdominal US - fatty liver. Hepatitis panel negative. 





5. HTN - continue Norvasc, Coreg.





6. COPD - Stable, no evidence of decompensation. On AnoroEllipta, Albuterol PRN.





7. Bipolar Disorder - Continue Olanzapine, Lamotrigine, Fluoxetine. 





8. HLD - Statin dose decreased due to elevated LFTs.





9. Macrocytic Anemia - etiology unclear. B12/folate non-deficient. ?sec to 

Liver disease +/- CKD. Iron 62/Ferritin 224/FOBT negative. 


Hematology consulted - recommend further out-patient work-up with Flow Cytometry

/FISH. Nephrology consulted ? any benefit from Epo.





10. Severe MR - evaluated by Cardiology - for out-patient follow up.





DVT Px - Heparin SQ.

## 2019-12-20 NOTE — PN
Physical Exam: 


SUBJECTIVE: Patient seen and examined. Patient states she would like to be 

discharged but explained to her that she has multiple medical conditions 

requiring care at this time. No acute events overnight. 








OBJECTIVE:





 Vital Signs











 Period  Temp  Pulse  Resp  BP Sys/Caceres  Pulse Ox


 


 Last 24 Hr  97.9 F-98.4 F  58-68  20-20  129-139/50-62  91











GENERAL: awake, alert, fully oriented 


HEAD: Normal with no signs of trauma.


EYES: EOMI, no scleral icterus


ENT: dry mucous membranes 


NECK: Trachea midline, supple 


LUNGS: mild expiratory wheezing at the bases, no crackles, no accessory muscle 

use. 


HEART: RRR, S1, S2 normal


ABDOMEN: Soft, nontender, nondistended, normoactive bowel sounds, no guarding, 

no rebound


MSK: Mild tenderness to palpation over right hip/femur. 


EXTREMITIES: 2+ pulses, warm, well-perfused, no edema. 


NEUROLOGICAL: Normal sensation throughout. AAOx2 (self and place)


SKIN: Warm, dry. Multiple small healing scabbed over abrasions noted on lower 

and upper extremities. 














 Laboratory Results - last 24 hr











  12/18/19 12/18/19 12/20/19





  07:40 07:40 06:19


 


WBC    6.9


 


RBC    2.22 L


 


Hgb    7.5 L


 


Hct    22.5 L


 


MCV    101.2 H


 


MCH    33.8 H


 


MCHC    33.4


 


RDW    13.4


 


Plt Count    174


 


MPV    10.1


 


Sodium   


 


Potassium   


 


Chloride   


 


Carbon Dioxide   


 


Anion Gap   


 


BUN   


 


Creatinine   


 


Est GFR (CKD-EPI)AfAm   


 


Est GFR (CKD-EPI)NonAf   


 


Random Glucose   


 


Calcium   


 


Total Bilirubin   


 


AST   


 


ALT   


 


Alkaline Phosphatase   


 


Total Protein   


 


Albumin   


 


Hep A IgM Ab Confirm  Negative  


 


Hepatitis A Ab Total  Negative  


 


Hep Bs Antigen  Negative  


 


Hep Bs Antibody  Non reactive  


 


Hep B Core Total Ab  Negative  


 


Hep B Core IgM Ab  Negative  


 


Hepatitis Be Antibody  Negative  Negative 


 


Hepatitis Be Antigen  Negative  














  12/20/19





  06:19


 


WBC 


 


RBC 


 


Hgb 


 


Hct 


 


MCV 


 


MCH 


 


MCHC 


 


RDW 


 


Plt Count 


 


MPV 


 


Sodium  137


 


Potassium  4.8


 


Chloride  111 H


 


Carbon Dioxide  19 L


 


Anion Gap  7 L


 


BUN  24.0 H


 


Creatinine  3.0 H


 


Est GFR (CKD-EPI)AfAm  16.91


 


Est GFR (CKD-EPI)NonAf  14.59


 


Random Glucose  97


 


Calcium  9.0


 


Total Bilirubin  0.9


 


AST  101 H


 


ALT  41


 


Alkaline Phosphatase  210 H


 


Total Protein  5.9 L


 


Albumin  2.5 L


 


Hep A IgM Ab Confirm 


 


Hepatitis A Ab Total 


 


Hep Bs Antigen 


 


Hep Bs Antibody 


 


Hep B Core Total Ab 


 


Hep B Core IgM Ab 


 


Hepatitis Be Antibody 


 


Hepatitis Be Antigen 








Active Medications











Generic Name Dose Route Start Last Admin





  Trade Name [employer]  PRN Reason Stop Dose Admin


 


Albuterol Sulfate  2 puff  12/18/19 16:15  





  Ventolin Hfa Inhaler -  IH   





  Q4H PRN   





  SHORTNESS OF BREATH   





     





     





     


 


Amlodipine Besylate  5 mg  12/18/19 16:15  12/20/19 12:01





  Norvasc -  PO   5 mg





  DAILY MITCH   Administration





     





     





     





     


 


Aspirin  81 mg  12/19/19 10:00  12/20/19 12:00





  Asa -  PO   81 mg





  DAILY MITCH   Administration





     





     





     





     


 


Calcitriol  0.25 mcg  12/20/19 10:00  12/20/19 12:00





  Rocaltrol -  PO   0.25 mcg





  Q2D [facility]   Administration





     





     





     





     


 


Carvedilol  12.5 mg  12/18/19 22:00  12/20/19 12:00





  Coreg -  PO   12.5 mg





  BID MITCH   Administration





     





     





     





     


 


Fluoxetine HCl  60 mg  12/19/19 10:00  12/20/19 12:01





  Prozac -  PO   60 mg





  DAILY MITCH   Administration





     





     





     





     


 


Heparin Sodium (Porcine)  5,000 unit  12/18/19 13:15  12/20/19 12:00





  Heparin -  SQ   5,000 unit





  BID MITCH   Administration





     





     





     





     


 


Sodium Chloride  1,000 mls @ 100 mls/hr  12/18/19 04:45  12/20/19 11:48





  Normal Saline -  IV   100 mls/hr





  ASDIR MITCH   Administration





     





     





     





     


 


Lamotrigine  200 mg  12/19/19 10:00  12/20/19 12:00





  Lamictal -  PO   200 mg





  DAILY [facility]   Administration





     





     





     





     


 


Olanzapine  5 mg  12/19/19 10:00  12/20/19 12:00





  Zyprexa -  PO   5 mg





  DAILY MITCH   Administration





     





     





     





     


 


Rosuvastatin Calcium  5 mg  12/19/19 22:00  12/19/19 22:44





  Crestor -  PO   5 mg





  HS MITCH   Administration





     





     





     





     


 


Umeclidinium/Vilanterol  1 puff  12/19/19 10:00  12/20/19 12:01





  Anoro Ellipta 62.5-25 Mcg Inh  IH   1 puff





  DAILY MITCH   Administration





     





     





     





     











ASSESSMENT/PLAN:


73 y/o/f with PMHx of HTN, COPD, bipolar disorder, hearing loss who presented 

to the ED from [facility] and assisted living [facility] for back 

pain, R hip pain and R thigh pain s/p fall with evidence of right frontal scalp 

edema. Patient has had multiple falls over the last two weeks as per assisted 

living facility. 





#Syncope vs. Fall


- Carotid U/S -  Small to moderate-size plaques at the left common carotid 

bifurcation with 50-69% stenosis,  There are also moderate-size plaques with 

calcifications at the right common carotid bifurcation bulb with 50-69% 

stenosis. 


- Vascular consulted for Carotid U/S findings 


   - no need for surgery at this time. Continue medical management. Would not 

fix unless the lesion is greater than 80% as per surg


- Echo - severe Mitral regurg 


   - Cardio recommending outpatient follow up


- Orthostatic vitals negative 


- Fall precautions


- Physical therapy 


- CT head - mild right frontal scalp edema without skull fracture or 

intracranial hemorrhage


- CT cervical spine - negative for fx. degenerative changes 


- CXR - no acute findings 


- Hip/Pelvis Xray - sclerotic density R intertrochanteric area, CT pelvis and 

lower extremity to r/o sclerotic density


- CT abd/pelvis and lower extremity - Small to moderate-size anterior pelvic 

wall hernia containing bowel loops without gross. No gross fracture or 

dislocation is identified.





#GLORIA vs CKD 3 - likely CKD 


- Renal U/S - atrophic + echogenic kidneys consistent with medical renal 

disease 


- unable to confirm patient's baseline BUN/Cr from assisted living home, unable 

to contact PCP





#Hepatic Dysfunction 


- elevated AST, GGT  


- Macrocytic Anemia - B12/Folate non-deficient


   - Nephrology consulted for anemia, recs appreciated 


- Heme Onc consulted for Macrocytic anemia. Appreciate recs


   - recommend further out-patient work-up with Flow Cytometry/FISH


- Abd U/S - atrophic kidneys, trace perihepatic fluid, minimal gallbladder wall 

thickening 


- Hepatitis panel tests pending 





#HTN 


- continue home meds





#COPD 


- continue home meds 


- On AnoroEllipta, Albuterol PRN.





#Bipolar Disorder


- Continue Olanzapine, Lamotrigine, Fluoxetine. 





#HLD 


- Decreased statin dose due to increased LFTs





#Prophylaxis 


- Heparin





#FEN


- Sodium controlled diet 


- NS @100mls/hr





#Disposition


- transfer to med surg, no tele events noted 





Visit type





- Emergency Visit


Emergency Visit: Yes


ED Registration Date: 12/18/19


Care time: The patient presented to the Emergency Department on the above date 

and was hospitalized for further evaluation of their emergent condition.





- New Patient


This patient is new to me today: No





- Critical Care


Critical Care patient: No





ATTENDING PHYSICIAN STATEMENT





I saw and evaluated the patient.


I reviewed the resident's note and discussed the case with the resident.


I agree with the resident's findings and plan as documented.








SUBJECTIVE:








OBJECTIVE:








ASSESSMENT AND PLAN:

## 2019-12-20 NOTE — CONSULT
Consult


Consult Specialty:: Nephrology


Reason for Consultation:: CKD





- History of Present Illness


Chief Complaint: back pain


History of Present Illness: 





Pt is a 74 year old female with pmhx of htn, copd, and bipolar who presents to 

the ER for back pain. She was found to have elevated creatinine and I was 

called to evaluate her. She denies shortness of breath. She denies dysuria or 

hematuria. She denies history of CKD. She says that she does take nsaids at 

times. She is a poor historian. 





- History Source


History Provided By: Patient, Medical Record





- Past Medical History


Cardio/Vascular: Yes: HTN


Renal/: Yes: Renal Inusuff





- Alcohol/Substance Use


Hx Alcohol Use: No





- Smoking History


Smoking history: Never smoked


Have you smoked in the past 12 months: No





Home Medications





- Allergies


Allergies/Adverse Reactions: 


 Allergies











Allergy/AdvReac Type Severity Reaction Status Date / Time


 


No Known Allergies Allergy   Verified 12/17/19 23:06














- Home Medications


Home Medications: 


Ambulatory Orders





Acetaminophen 650 mg PO QID PRN 12/17/19 


Albuterol Sulfate Inhaler - [Ventolin Hfa Inhaler -] 1 - 2 inh PO Q4H 12/17/19 


Albuterol Sulfate Inhaler - [Ventolin Hfa Inhaler -] 1 puff IH QID 12/17/19 


Amlodipine Besylate 5 mg PO DAILY 12/17/19 


Aspirin 81 mg PO DAILY 12/17/19 


Calcitriol [Rocaltrol -] 0.25 mcg PO Q2D 12/17/19 


Carvedilol [Coreg -] 12.5 mg PO BID 12/17/19 


Fluoxetine HCl 60 mg PO DAILY 12/17/19 


Lamotrigine [Lamictal] 200 mg PO DAILY 12/17/19 


Olanzapine [Zyprexa -] 5 mg PO DAILY 12/17/19 


Rosuvastatin Calcium [Crestor] 10 mg PO DAILY 12/17/19 


Sodium Chloride [Nasadrops] 15 ml NS DAILY 12/17/19 


Umeclidinium Brm/Vilanterol Tr [Anoro Ellipta 62.5-25 Mcg INH] 1 each IH DAILY 

12/17/19 











Family Medical History


Family History: Denies





Review of Systems





- Review of Systems


Constitutional: reports: Malaise


Eyes: reports: No Symptoms


HENT: reports: No Symptoms


Neck: reports: No Symptoms


Cardiovascular: reports: No Symptoms


Respiratory: reports: No Symptoms


Gastrointestinal: reports: No Symptoms


Genitourinary: reports: No Symptoms


Musculoskeletal: reports: Back Pain


Neurological: reports: No Symptoms


Endocrine: reports: No Symptoms


Hematology/Lymphatic: reports: No Symptoms





Physical Exam


Vital Signs: 


 Vital Signs











Temperature  97.3 F L  12/20/19 14:35


 


Pulse Rate  68   12/20/19 14:35


 


Respiratory Rate  20   12/20/19 14:35


 


Blood Pressure  143/56 L  12/20/19 14:35


 


O2 Sat by Pulse Oximetry (%)  96   12/20/19 09:00











Constitutional: Yes: Calm


Eyes: Yes: Conjunctiva Clear


HENT: Yes: Atraumatic


Cardiovascular: Yes: S1, S2


Respiratory: Yes: CTA Bilaterally


Gastrointestinal: Yes: Soft


Renal/: Yes: WNL


Musculoskeletal: Yes: Back Pain


Extremities: Yes: WNL


Edema: No


Neurological: Yes: Oriented


Psychiatric: Yes: Oriented


Labs: 


 CBC, BMP





 12/20/19 06:19 





 12/20/19 06:19 





 Laboratory Tests











  12/17/19 12/18/19 12/18/19





  23:39 01:25 06:44


 


Creatinine  3.5 H   3.4 H


 


Urine Protein   1+ H 


 


Urine Blood   1+ H 














  12/19/19 12/20/19





  06:35 06:19


 


Creatinine  3.0 H  3.0 H


 


Urine Protein  


 


Urine Blood  














Imaging





- Results


Ultrasound: Report Reviewed





Problem List





- Problems


(1) CKD (chronic kidney disease)


Code(s): N18.9 - CHRONIC KIDNEY DISEASE, UNSPECIFIED   





(2) HTN (hypertension)


Code(s): I10 - ESSENTIAL (PRIMARY) HYPERTENSION   





(3) Bipolar 1 disorder


Code(s): F31.9 - BIPOLAR DISORDER, UNSPECIFIED   





(4) Back pain


Code(s): M54.9 - DORSALGIA, UNSPECIFIED   





Assessment/Plan





 Current Medications











Generic Name Dose Route Start Last Admin





  Trade Name Freq  PRN Reason Stop Dose Admin


 


Albuterol Sulfate  2 puff  12/18/19 16:15  





  Ventolin Hfa Inhaler -  IH   





  Q4H PRN   





  SHORTNESS OF BREATH   





     





     





     


 


Amlodipine Besylate  5 mg  12/18/19 16:15  12/20/19 12:01





  Norvasc -  PO   5 mg





  DAILY MITCH   Administration





     





     





     





     


 


Aspirin  81 mg  12/19/19 10:00  12/20/19 12:00





  Asa -  PO   81 mg





  DAILY MITCH   Administration





     





     





     





     


 


Calcitriol  0.25 mcg  12/20/19 10:00  12/20/19 12:00





  Rocaltrol -  PO   0.25 mcg





  Q2D MITCH   Administration





     





     





     





     


 


Carvedilol  12.5 mg  12/18/19 22:00  12/20/19 12:00





  Coreg -  PO   12.5 mg





  BID MITCH   Administration





     





     





     





     


 


Fluoxetine HCl  60 mg  12/19/19 10:00  12/20/19 12:01





  Prozac -  PO   60 mg





  DAILY MITCH   Administration





     





     





     





     


 


Heparin Sodium (Porcine)  5,000 unit  12/18/19 13:15  12/20/19 12:00





  Heparin -  SQ   5,000 unit





  BID MITCH   Administration





     





     





     





     


 


Sodium Chloride  1,000 mls @ 100 mls/hr  12/18/19 04:45  12/20/19 11:48





  Normal Saline -  IV   100 mls/hr





  ASDIR MITCH   Administration





     





     





     





     


 


Lamotrigine  200 mg  12/19/19 10:00  12/20/19 12:00





  Lamictal -  PO   200 mg





  DAILY MITCH   Administration





     





     





     





     


 


Olanzapine  5 mg  12/19/19 10:00  12/20/19 12:00





  Zyprexa -  PO   5 mg





  DAILY MITCH   Administration





     





     





     





     


 


Rosuvastatin Calcium  5 mg  12/19/19 22:00  12/19/19 22:44





  Crestor -  PO   5 mg





  HS MITCH   Administration





     





     





     





     


 


Umeclidinium/Vilanterol  1 puff  12/19/19 10:00  12/20/19 12:01





  Anoro Ellipta 62.5-25 Mcg Inh  IH   1 puff





  DAILY MITCH   Administration





     





     





     





     








Impression


1. CKD


2. HTN


3. bipolar


4. s/p fall


5. HLD





Plan


- kidney appear echogenic on ultrasound


- likely ckd


- will need outpt follow up


- obtain outpt labs to evaluate baseline crt


- renal function did improve


- avoid nsaids

## 2019-12-20 NOTE — PN
Teaching Attending Note


Name of Resident: Sylvain Hernandez





ATTENDING PHYSICIAN STATEMENT





I saw and evaluated the patient.


I reviewed the resident's note and discussed the case with the resident.


I agree with the resident's findings and plan as documented.








SUBJECTIVE:


Patient seen and examined 





Presented after being found on floor. 


Details unclear 


Found to have macrocytic anemia, renal insufficieny with elevated creatinine , 

macrocytosis , with normal B-12, folate, and elevated TSH. 


Has abnormal LFT's.


Absolute retic count elevated , but corrected retic count normal.


 Last Vital Signs











Temp Pulse Resp BP Pulse Ox


 


 97.3 F L  68   20   143/56 L  96 


 


 12/20/19 14:35  12/20/19 14:35  12/20/19 14:35  12/20/19 14:35  12/20/19 09:00











HEENT: RONALDO, EOM Intact


Oropharynx: No thrush, No mucositis


Neck: Supple


Nodes: Without adenopathy


Breasts: Without masses


Cor: RSR, No murmurs, No gallops


Lungs: Clear to P&A


Abd: Soft, Normal bowel sounds, No organomegaly


Ext:No significant edema


Skin: No rashes, Integument intact


 CBC, BMP





 12/20/19 06:19 





 12/20/19 06:19 





 Current Medications











Generic Name Dose Route Start Last Admin





  Trade Name Freq  PRN Reason Stop Dose Admin


 


Albuterol Sulfate  2 puff  12/18/19 16:15  





  Ventolin Hfa Inhaler -  IH   





  Q4H PRN   





  SHORTNESS OF BREATH   





     





     





     


 


Amlodipine Besylate  5 mg  12/18/19 16:15  12/20/19 12:01





  Norvasc -  PO   5 mg





  DAILY MITCH   Administration





     





     





     





     


 


Aspirin  81 mg  12/19/19 10:00  12/20/19 12:00





  Asa -  PO   81 mg





  DAILY MITCH   Administration





     





     





     





     


 


Calcitriol  0.25 mcg  12/20/19 10:00  12/20/19 12:00





  Rocaltrol -  PO   0.25 mcg





  Q2D MITCH   Administration





     





     





     





     


 


Carvedilol  12.5 mg  12/18/19 22:00  12/20/19 12:00





  Coreg -  PO   12.5 mg





  BID MITCH   Administration





     





     





     





     


 


Fluoxetine HCl  60 mg  12/19/19 10:00  12/20/19 12:01





  Prozac -  PO   60 mg





  DAILY MITCH   Administration





     





     





     





     


 


Heparin Sodium (Porcine)  5,000 unit  12/18/19 13:15  12/20/19 12:00





  Heparin -  SQ   5,000 unit





  BID MITCH   Administration





     





     





     





     


 


Sodium Chloride  1,000 mls @ 100 mls/hr  12/18/19 04:45  12/20/19 11:48





  Normal Saline -  IV   100 mls/hr





  ASDIR MITCH   Administration





     





     





     





     


 


Lamotrigine  200 mg  12/19/19 10:00  12/20/19 12:00





  Lamictal -  PO   200 mg





  DAILY MITCH   Administration





     





     





     





     


 


Olanzapine  5 mg  12/19/19 10:00  12/20/19 12:00





  Zyprexa -  PO   5 mg





  DAILY MITCH   Administration





     





     





     





     


 


Rosuvastatin Calcium  5 mg  12/19/19 22:00  12/19/19 22:44





  Crestor -  PO   5 mg





  HS MITCH   Administration





     





     





     





     


 


Umeclidinium/Vilanterol  1 puff  12/19/19 10:00  12/20/19 12:01





  Anoro Ellipta 62.5-25 Mcg Inh  IH   1 puff





  DAILY MITCH   Administration





     





     





     





     











Impression:








Macrocytic anemia - nl B-12, folate, mild increase in TSH,  reverse a/g ratio, 

normal corrected retic count , abnormal LFT's. 





Macrocytosis may be secondary to elevated TSH, abnormal LFT's, or ? MDS. 


Flow cytometry pending 


Will need protein studies.


Needs sono of liver/spleen.











OBJECTIVE:








ASSESSMENT AND PLAN:

## 2019-12-21 LAB
ALBUMIN SERPL-MCNC: 2.4 G/DL (ref 3.4–5)
ALP SERPL-CCNC: 180 U/L (ref 45–117)
ALT SERPL-CCNC: 40 U/L (ref 13–61)
ANION GAP SERPL CALC-SCNC: 9 MMOL/L (ref 8–16)
AST SERPL-CCNC: 89 U/L (ref 15–37)
BILIRUB DIRECT SERPL-MCNC: 150 U/L (ref 84–246)
BILIRUB SERPL-MCNC: 1.1 MG/DL (ref 0.2–1)
BUN SERPL-MCNC: 24.1 MG/DL (ref 7–18)
CALCIUM SERPL-MCNC: 9.3 MG/DL (ref 8.5–10.1)
CHLORIDE SERPL-SCNC: 113 MMOL/L (ref 98–107)
CO2 SERPL-SCNC: 17 MMOL/L (ref 21–32)
CREAT SERPL-MCNC: 2.7 MG/DL (ref 0.55–1.3)
DEPRECATED RDW RBC AUTO: 13.9 % (ref 11.6–15.6)
GLUCOSE SERPL-MCNC: 115 MG/DL (ref 74–106)
HCT VFR BLD CALC: 22.2 % (ref 32.4–45.2)
HGB BLD-MCNC: 7.3 GM/DL (ref 10.7–15.3)
MCH RBC QN AUTO: 33.7 PG (ref 25.7–33.7)
MCHC RBC AUTO-ENTMCNC: 32.8 G/DL (ref 32–36)
MCV RBC: 102.8 FL (ref 80–96)
PLATELET # BLD AUTO: 174 K/MM3 (ref 134–434)
PMV BLD: 10.3 FL (ref 7.5–11.1)
POTASSIUM SERPLBLD-SCNC: 4.9 MMOL/L (ref 3.5–5.1)
PROT SERPL-MCNC: 5.7 G/DL (ref 6.4–8.2)
RBC # BLD AUTO: 2.16 M/MM3 (ref 3.6–5.2)
SODIUM SERPL-SCNC: 139 MMOL/L (ref 136–145)
WBC # BLD AUTO: 6.7 K/MM3 (ref 4–10)

## 2019-12-21 RX ADMIN — HEPARIN SODIUM SCH UNIT: 5000 INJECTION, SOLUTION INTRAVENOUS; SUBCUTANEOUS at 09:40

## 2019-12-21 RX ADMIN — HEPARIN SODIUM SCH UNIT: 5000 INJECTION, SOLUTION INTRAVENOUS; SUBCUTANEOUS at 21:29

## 2019-12-21 RX ADMIN — ROSUVASTATIN CALCIUM SCH MG: 5 TABLET, FILM COATED ORAL at 21:29

## 2019-12-21 RX ADMIN — SODIUM CHLORIDE SCH: 9 INJECTION, SOLUTION INTRAVENOUS at 09:37

## 2019-12-21 RX ADMIN — UMECLIDINIUM BROMIDE AND VILANTEROL TRIFENATATE SCH PUFF: 62.5; 25 POWDER RESPIRATORY (INHALATION) at 09:38

## 2019-12-21 RX ADMIN — CARVEDILOL SCH MG: 12.5 TABLET, FILM COATED ORAL at 21:30

## 2019-12-21 RX ADMIN — FLUOXETINE HYDROCHLORIDE SCH MG: 20 CAPSULE ORAL at 09:39

## 2019-12-21 RX ADMIN — CARVEDILOL SCH MG: 12.5 TABLET, FILM COATED ORAL at 09:39

## 2019-12-21 RX ADMIN — ASPIRIN 81 MG SCH MG: 81 TABLET ORAL at 09:40

## 2019-12-21 RX ADMIN — AMLODIPINE BESYLATE SCH MG: 5 TABLET ORAL at 09:40

## 2019-12-21 NOTE — PN
Progress Note, Physician


Chief Complaint: 





syncope


History of Present Illness: 








denies dizziness (or prior h/o syncope)


no palpitations, cp


feels sob today





denies drugs





- Current Medication List


Current Medications: 


Active Medications





Albuterol Sulfate (Ventolin Hfa Inhaler -)  2 puff IH Q4H PRN


   PRN Reason: SHORTNESS OF BREATH


Amlodipine Besylate (Norvasc -)  5 mg PO DAILY [facility]


   Last Admin: 12/20/19 12:01 Dose:  5 mg


Aspirin (Asa -)  81 mg PO DAILY [facility]


   Last Admin: 12/20/19 12:00 Dose:  81 mg


Calcitriol (Rocaltrol -)  0.25 mcg PO Q2D [facility]


   Last Admin: 12/20/19 12:00 Dose:  0.25 mcg


Carvedilol (Coreg -)  12.5 mg PO BID [facility]


   Last Admin: 12/20/19 22:19 Dose:  12.5 mg


Fluoxetine HCl (Prozac -)  60 mg PO DAILY [facility]


   Last Admin: 12/20/19 12:01 Dose:  60 mg


Heparin Sodium (Porcine) (Heparin -)  5,000 unit SQ BID [facility]


   Last Admin: 12/20/19 22:19 Dose:  5,000 unit


Sodium Chloride (Normal Saline -)  1,000 mls @ 100 mls/hr IV ASDIR [facility]


   Last Admin: 12/20/19 11:48 Dose:  100 mls/hr


Lamotrigine (Lamictal -)  200 mg PO DAILY [facility]


   Last Admin: 12/20/19 12:00 Dose:  200 mg


Olanzapine (Zyprexa -)  5 mg PO DAILY [facility]


   Last Admin: 12/20/19 12:00 Dose:  5 mg


Rosuvastatin Calcium (Crestor -)  5 mg PO HS [facility]


   Last Admin: 12/20/19 22:19 Dose:  5 mg


Umeclidinium/Vilanterol (Anoro Ellipta 62.5-25 Mcg Inh)  1 puff IH DAILY [facility]


   Last Admin: 12/20/19 12:01 Dose:  1 puff











- Objective


Vital Signs: 


 Vital Signs











Temperature  98.0 F   12/20/19 22:00


 


Pulse Rate  65   12/21/19 06:00


 


Respiratory Rate  20   12/21/19 06:00


 


Blood Pressure  133/49 L  12/21/19 06:00


 


O2 Sat by Pulse Oximetry (%)  94 L  12/20/19 21:00











Constitutional: Yes: No Distress, Calm


Eyes: No: Sclera Icterus


HENT: No: Nasal Congestion


Cardiovascular: Yes: Regular Rate and Rhythm, S1, S2, Other (PMI non diplaced).

  No: JVD, Gallop, Murmur


Respiratory: Yes: CTA Bilaterally, Wheezes.  No: Accessory Muscle Use, Rales


Gastrointestinal: Yes: Normal Bowel Sounds, Soft.  No: Tenderness


Musculoskeletal: Yes: Other (No kyphosis)


Extremities: No: Cold, Cyanosis


Edema: No


Integumentary: No: Jaundice


Neurological: Yes: Alert.  No: Seizure


Psychiatric: No: Agitated


Labs: 


 CBC, BMP





 12/20/19 06:19 





 12/20/19 06:19 





 INR, PTT











INR  1.05  (0.83-1.09)   12/17/19  23:39    














Assessment/Plan





echo 12/2019 tds, nl LV function,mod to severe MR, mod MAC, RWMA cannot be 

excluded, RV nl





ecg 12/21: NSR, normal axis, QTc (manually calculated) = 490 msec. (no isch 

changes)





cxr: prominent central markings





tele: sinus, artifact

















fall, syncope


- pt states she rolled out of bed to floor while asleep, however cannot provide 

details or recollection of sequence of events once on the floor. sustained 

ecchymosis R forehead


- no events on tele thus far


- carotid stenosis on doppler, <50% - no intervention per vascular


- mild drop on orthostatics (9 mmHg)


- trop neg, no ischemic changes on EKG





prolonged QTc


- initially 515 ms, now improved 490 range (as above)


- avoid QT prolonging agents


- maintain K>4, Mg >2


- given uncertainty of history (cannot confirm pt report that she rolled out of 

bed in her sleep), would rec outpatient 28 day patch monitor if tele benign 

while here


- both fluoxetine and olanzapine can prolong QT (amee in combination). rec 

continued monitoring of ecg--if QT remains <500 and psych believes she is at 

significant risk of decompensation if meds are changed, then reasonable to 

continue these with frequent routine ECG monitoring as outpatient. no firm 

guidelines--reasonable recommendation would be ECG q1-2 months. if QT prolongs 

to 500+ in future, will need to alter her regimen.





sob, wheezing:


- appears euvolemic, cxr underwhelming


- suspect bronchospasm--cont prn nebs for now as doing





mitral regurgitation


- moderate to severe MR on echo


- denies chest pain, dyspnea


- outpatient follow up and repeat echo with MR quantification will be needed in 

our office, +/- ? valve center





CKD


- per pt had dialysis access placed in the past, but no history of HD


- ? baseline creat


- plan per renal


 


HTN


- stable on current meds, continue





bipolar disorder


- manage per primary





anemia


- etiology uncertain, heme is evaluating

## 2019-12-21 NOTE — EKG
Test Reason : 

Blood Pressure : ***/*** mmHG

Vent. Rate : 071 BPM     Atrial Rate : 071 BPM

   P-R Int : 148 ms          QRS Dur : 092 ms

    QT Int : 454 ms       P-R-T Axes : 041 010 062 degrees

   QTc Int : 493 ms

 

NORMAL SINUS RHYTHM

PROLONGED QT

ABNORMAL ECG

WHEN COMPARED WITH ECG OF 18-DEC-2019 14:31,

NO SIGNIFICANT CHANGE WAS FOUND

Confirmed by MARCELLA DUENAS MD ([id number]) on 12/21/2019 1:58:01 PM

 

Referred By: DC ROBERTSON           Confirmed By:MARCELLA DUENAS MD

## 2019-12-21 NOTE — DS
Physical Exam: 


SUBJECTIVE: Patient seen and examined. No acute events overnight. denies abd 

pain, SOB, chest pain. 








OBJECTIVE:





 Vital Signs











 Period  Temp  Pulse  Resp  BP Sys/Caceres  Pulse Ox


 


 Last 24 Hr  97.3 F-98.0 F  65-69  20-22  133-156/49-58  93-94








PHYSICAL EXAM





GENERAL: awake, alert, fully oriented 


HEAD: Normal with no signs of trauma.


EYES: EOMI, no scleral icterus


ENT: dry mucous membranes 


NECK: Trachea midline, supple 


LUNGS: CTA B/L. no crackles, no accessory muscle use. 


HEART: RRR, S1, S2 normal


ABDOMEN: Soft, nontender, nondistended, normoactive bowel sounds, no guarding, 

no rebound


MSK: Mild tenderness to palpation over right hip/femur. 


EXTREMITIES: 2+ pulses, warm, well-perfused, no edema. 


NEUROLOGICAL: Normal sensation throughout. AAOx2 (self and place)


SKIN: Warm, dry. Multiple small healing scabbed over abrasions noted on lower 

and upper extremities. 





LABS


 Laboratory Results - last 24 hr











  12/21/19 12/21/19





  07:15 07:15


 


WBC  6.7 


 


RBC  2.16 L 


 


Hgb  7.3 L 


 


Hct  22.2 L 


 


MCV  102.8 H 


 


MCH  33.7 


 


MCHC  32.8 


 


RDW  13.9 


 


Plt Count  174 


 


MPV  10.3 


 


Sodium   139


 


Potassium   4.9


 


Chloride   113 H


 


Carbon Dioxide   17 L


 


Anion Gap   9


 


BUN   24.1 H


 


Creatinine   2.7 H


 


Est GFR (CKD-EPI)AfAm   19.21


 


Est GFR (CKD-EPI)NonAf   16.57


 


Random Glucose   115 H


 


Calcium   9.3


 


Total Bilirubin   1.1 H


 


AST   89 H


 


ALT   40


 


Alkaline Phosphatase   180 H


 


LD Total   150


 


Total Protein   5.7 L


 


Albumin   2.4 L











HOSPITAL COURSE:





Date of Admission:12/18/19





Date of Discharge: 12/21/19





73 y/o/f with PMHx of HTN, COPD, bipolar disorder, hearing loss who presented 

to the ED from [facility] and assisted living [facility] for back 

pain, R hip pain and R thigh pain s/p fall with evidence of right frontal scalp 

edema. Patient has had multiple falls over the last two weeks as per assisted 

living facility. Patient had imaging done including CR head, CT cervical spine, 

Hep/Pelvis X-rays, CT Pelvis, CT lower extremity which was all negative for 

fracture, masses, or bleeding. Patient was worked up for syncope vs. fall. 

Carotid U/S showed <50% stenosis of carotids but patient was evaluated by 

vascular surgery and there was no need for intervention at this time. 

Orthostatic vitals were negative. On ECHO patient was found to have severe 

Mitral Regurg. On initial EKG patient had a QTc of 515 which improved to 491 on 

repeat EKG. However, Cardio recommended outpatient follow up for the Mitral 

valve regurg and for monthly EKG monitoring as patient is on QTc prolonging 

medications. Patient had elevated kidney function on admission without any 

known history of CKD. U/S showed echogenic and atrophic kidneys suggestive of 

chronic kidney disease. Nephro was consulted and recommended outpatient follow 

up to evaluate baseline Cr. On labs patient was also found to have macrocytic 

anemia of unclear etiology (B12, folate normal). Heme onc was consulted and 

recommended further workup outpatient. Patient's home medications were 

continued while in the hospital. Follow up was given for Cardiology, Heme/Onc, 

and nephrology. Patient stable for discharge to SNF.  





Minutes to complete discharge: 36





Discharge Summary


Problems reviewed: Yes


Reason For Visit: SYNCOPE


Current Active Problems





Back pain (Acute)


Bipolar 1 disorder (Acute)


CKD (chronic kidney disease) (Acute)


HTN (hypertension) (Acute)








Hospital Course: 


73 y/o/f with PMHx of HTN, COPD, bipolar disorder, hearing loss who presented 

to the ED from [facility] and assisted living [facility] for back 

pain, R hip pain and R thigh pain s/p fall with evidence of right frontal scalp 

edema. Patient has had multiple falls over the last two weeks as per assisted 

living facility. Patient had imaging done including CT scan head, CT cervical 

spine, Hep/Pelvis X-rays, CT Pelvis, CT lower extremity which was all negative 

for fracture, masses, or bleeding. Patient was worked up for syncope vs. fall. 

Carotid U/S showed <50% stenosis of carotids but patient was evaluated by 

vascular surgery and there was no need for intervention at this time. 

Orthostatic vitals were negative. On ECHO patient was found to have severe 

Mitral Regurg. On initial EKG patient had a QTc of 515 which improved to 491 on 

repeat EKG. However, Cardio recommended outpatient follow up for the Mitral 

valve regurg and for serial EKG monitoring 1-2 times a month as patient is on 

QTc prolonging medications. Patient had elevated kidney function on admission 

without any known history of CKD. Kidney ultrasound showed echogenic and 

atrophic kidneys suggestive of chronic kidney disease. Nephrology was consulted 

and recommended outpatient follow up to evaluate baseline Cr. On labs patient 

was also found to have macrocytic anemia of unclear etiology (B12, folate normal

). Heme onc was consulted and recommended further workup outpatient. She was 

noted with elevated TSH and will need outpatient monitoring. Patient's home 

medications were continued while in the hospital. Follow up was given for 

Cardiology, Heme/Onc, and nephrology. Patient stable for discharge to SNF.  


Condition: Stable





- Instructions


Diet, Activity, Other Instructions: 


You presented to the hospital after being found on the floor after an 

unwitnessed fall. You had imaging done of your head, cervical spine, hip, and 

pelvis which showed no fractures or bleeding. You had an cardiac ultrasound 

done which showed severe mitral valve regurgitation for which Cardiology 

recommends outpatient follow up for a repeat ultrasound. You also had an EKG 

done which showed a prolonged QTc for which cardiology recommended repeat EKGs 

every 1-2 months for monitoring. Your kidney function was also found to be 

elevated on admission, you were seen by Nephrology who recommended outpatient 

follow up. You were found to have anemia during this admission and were seen by 

Heme/Onc who recommended outpatient follow up as well. Your home medications 

were continued while in the hospital. 





Medication changes:


1. Your Rosuvastatin dose was decreased to 5mg daily. 





Follow up with the following physicians:


1. Please follow up with your primary care provider within one week of 

discharge as you will need repeat labs of your liver.


2. Follow up with Hematology/Oncology, Dr. Goldberg, within one week of discharge 

for further evaluation of your anemia. 


3. Follow up with Dr. Barrios, Nephrology, for your kidney disease and follow 

up labs.


4. Follow up with Dr. Mills, Cardiology, for further evaluation of your 

mitral valve regurgitation and EKG monitoring.  


You will need serial EKG monitoring 1-2 times a month with your doctor and 

adjustment of your psychiatric medications accordingly. 


5. With your psychiatrist to adjust your medications based on EKG readings (and 

"Qtc interval")


6. Vascular surgery Follow up Dr. Cassidy (to monitor carotid stenosis), Regular 

carotid Ultrasound screening





FOLLOW UP BLOOD TESTS:


CBC, BMP in 1 week


Thyroid function tests in 2-3 weeks


EKG 1-2 times a month


Regular screening carotid Ultrasound to monitor narrowing 





PENDING BLOOD TESTS:


Immune electrophoresis (To be followed with Dr. Barrios on next visit in 1-2 

weeks)


Flow cytometry (to be followed with Dr. Goldberg in 1-2 weeks on next visit)





Activity and Diet


1. You are being discharged to a rehab facility to strengthen your muscles.


2. Please continue to monitor your diet as you need to intake less salt and 

drink plenty of fluids.


3. Please do not use NSAIDs (Ibuprofen, Advil, Motrin etc.)





Continue all your other medications as prescribed





Please return to the ER if you have any signs or symptoms of chest pain, 

shortness of breath, uncontrollable fever, chills, nausea, vomiting, numbness, 

tingling, or weakness in any part of your body, changes in vision, or slurred 

speech.





Please return to the ER if symptoms persist, worsen, or new symptoms arise.


Referrals: 


Santhosh Mills MD [Staff Physician] - 


Gio Cassidy DO [Staff Physician] - 


Jay Ford MD [Primary Care Provider] - 


Romeo Goldberg MD [Staff Physician] - 


Alexa Barrios MD [Staff Physician] - 


Disposition: SKILLED NURSING FACILITY





- Home Medications


Comprehensive Discharge Medication List: 


Ambulatory Orders





Albuterol Sulfate Inhaler - [Ventolin HFA Inhaler -] 1 puff IH QID 12/17/19 


Amlodipine Besylate 5 mg PO DAILY 12/17/19 


Aspirin 81 mg PO DAILY 12/17/19 


Calcitriol [Calcitriol -] 0.25 mcg PO Q2D 12/17/19 


Carvedilol [Coreg -] 12.5 mg PO BID 12/17/19 


Fluoxetine HCl 60 mg PO DAILY 12/17/19 


Lamotrigine [Lamictal] 200 mg PO DAILY 12/17/19 


Olanzapine [Zyprexa -] 5 mg PO DAILY 12/17/19 


Sodium Chloride [Nasadrops] 15 ml NS DAILY 12/17/19 


Umeclidinium Brm/Vilanterol Tr [Anoro Ellipta 62.5-25 Mcg INH] 1 each IH DAILY 

12/17/19 


Rosuvastatin [Crestor -] 5 mg PO HS #30 tablet 12/21/19 








This patient is new to me today: No


Emergency Visit: Yes


ED Registration Date: 12/18/19


Care time: The patient presented to the Emergency Department on the above date 

and was hospitalized for further evaluation of their emergent condition.


Critical Care patient: No





- Discharge Referral


Referred to [facility] No





ATTENDING PHYSICIAN STATEMENT





I saw and evaluated the patient.


I reviewed the resident's note and discussed the case with the resident.


I agree with the resident's findings and plan as documented.








SUBJECTIVE:








OBJECTIVE:








ASSESSMENT AND PLAN:

## 2019-12-21 NOTE — PN
Progress Note (short form)





- Note


Progress Note: 





1. CKD


2. HTN


3. bipolar


4. s/p fall


5. HLD





 Current Medications





Albuterol Sulfate (Ventolin Hfa Inhaler -)  2 puff IH Q4H PRN


   PRN Reason: SHORTNESS OF BREATH


Amlodipine Besylate (Norvasc -)  5 mg PO DAILY [facility]


   Last Admin: 12/21/19 09:40 Dose:  5 mg


Aspirin (Asa -)  81 mg PO DAILY [facility]


   Last Admin: 12/21/19 09:40 Dose:  81 mg


Calcitriol (Rocaltrol -)  0.25 mcg PO Q2D [facility]


   Last Admin: 12/20/19 12:00 Dose:  0.25 mcg


Carvedilol (Coreg -)  12.5 mg PO BID [facility]


   Last Admin: 12/21/19 09:39 Dose:  12.5 mg


Fluoxetine HCl (Prozac -)  60 mg PO DAILY [facility]


   Last Admin: 12/21/19 09:39 Dose:  60 mg


Heparin Sodium (Porcine) (Heparin -)  5,000 unit SQ BID [facility]


   Last Admin: 12/21/19 09:40 Dose:  5,000 unit


Lamotrigine (Lamictal -)  200 mg PO DAILY [facility]


   Last Admin: 12/21/19 09:43 Dose:  200 mg


Olanzapine (Zyprexa -)  5 mg PO DAILY [facility]


   Last Admin: 12/21/19 09:42 Dose:  5 mg


Rosuvastatin Calcium (Crestor -)  5 mg PO HS [facility]


   Last Admin: 12/20/19 22:19 Dose:  5 mg


Umeclidinium/Vilanterol (Anoro Ellipta 62.5-25 Mcg Inh)  1 puff IH DAILY [facility]


   Last Admin: 12/21/19 09:38 Dose:  1 puff





 Last Vital Signs











Temp Pulse Resp BP Pulse Ox


 


 98 F   68   18   142/72   93 L


 


 12/21/19 14:20  12/21/19 14:20  12/21/19 14:20  12/21/19 14:20  12/21/19 09:00








Lungs clear


Heart reg


Abd soft 








 CBC, BMP





 12/21/19 07:15 





 12/21/19 07:15 





IMP CKD








Plan- continue current rx

## 2019-12-21 NOTE — PN
Teaching Attending Note


Name of Resident: Sharif Early





ATTENDING PHYSICIAN STATEMENT





I saw and evaluated the patient.


I reviewed the resident's note and discussed the case with the resident.


I agree with the resident's findings and plan as documented.








SUBJECTIVE: Wants to return home to AL facility. Feels well. No chest pain/

palpitations/dyspnea/lightheadedness. No fever/chills. 








OBJECTIVE: Afebrile, Hemodynamically Stable. SpO2 94% on 2L via NC. 





 Last Vital Signs











Temp Pulse Resp BP Pulse Ox


 


 97.8 F   66   22 H  156/58 L  93 L


 


 12/21/19 10:00  12/21/19 10:00  12/21/19 10:00  12/21/19 10:00  12/21/19 09:00











Heart - S1, S2 soft SM


Lungs - clear to auscultation


Abdomen - soft, non-tender. Bowel Sounds normal.


Extremities - No edema, no calf tenderness.


Neuro - AAO x 2. Tone/Power normal all extremities.





 Laboratory Results - last 24 hr











  12/21/19 12/21/19





  07:15 07:15


 


WBC  6.7 


 


RBC  2.16 L 


 


Hgb  7.3 L 


 


Hct  22.2 L 


 


MCV  102.8 H 


 


MCH  33.7 


 


MCHC  32.8 


 


RDW  13.9 


 


Plt Count  174 


 


MPV  10.3 


 


Sodium   139


 


Potassium   4.9


 


Chloride   113 H


 


Carbon Dioxide   17 L


 


Anion Gap   9


 


BUN   24.1 H


 


Creatinine   2.7 H


 


Est GFR (CKD-EPI)AfAm   19.21


 


Est GFR (CKD-EPI)NonAf   16.57


 


Random Glucose   115 H


 


Calcium   9.3


 


Total Bilirubin   1.1 H


 


AST   89 H


 


ALT   40


 


Alkaline Phosphatase   180 H


 


LD Total   150


 


Total Protein   5.7 L


 


Albumin   2.4 L








 Current Medications











Generic Name Dose Route Start Last Admin





  Trade Name Freq  PRN Reason Stop Dose Admin


 


Albuterol Sulfate  2 puff  12/18/19 16:15  





  Ventolin Hfa Inhaler -  IH   





  Q4H PRN   





  SHORTNESS OF BREATH   





     





     





     


 


Amlodipine Besylate  5 mg  12/18/19 16:15  12/21/19 09:40





  Norvasc -  PO   5 mg





  DAILY MITCH   Administration





     





     





     





     


 


Aspirin  81 mg  12/19/19 10:00  12/21/19 09:40





  Asa -  PO   81 mg





  DAILY MITCH   Administration





     





     





     





     


 


Calcitriol  0.25 mcg  12/20/19 10:00  12/20/19 12:00





  Rocaltrol -  PO   0.25 mcg





  Q2D MITCH   Administration





     





     





     





     


 


Carvedilol  12.5 mg  12/18/19 22:00  12/21/19 09:39





  Coreg -  PO   12.5 mg





  BID MITCH   Administration





     





     





     





     


 


Fluoxetine HCl  60 mg  12/19/19 10:00  12/21/19 09:39





  Prozac -  PO   60 mg





  DAILY MITCH   Administration





     





     





     





     


 


Heparin Sodium (Porcine)  5,000 unit  12/18/19 13:15  12/21/19 09:40





  Heparin -  SQ   5,000 unit





  BID MITCH   Administration





     





     





     





     


 


Sodium Chloride  1,000 mls @ 100 mls/hr  12/18/19 04:45  12/21/19 09:37





  Normal Saline -  IV   Not Given





  ASDIR MITCH   





     





     





     





     


 


Lamotrigine  200 mg  12/19/19 10:00  12/21/19 09:43





  Lamictal -  PO   200 mg





  DAILY MITCH   Administration





     





     





     





     


 


Olanzapine  5 mg  12/19/19 10:00  12/21/19 09:42





  Zyprexa -  PO   5 mg





  DAILY MITCH   Administration





     





     





     





     


 


Rosuvastatin Calcium  5 mg  12/19/19 22:00  12/20/19 22:19





  Crestor -  PO   5 mg





  HS MITCH   Administration





     





     





     





     


 


Umeclidinium/Vilanterol  1 puff  12/19/19 10:00  12/21/19 09:38





  Anoro Ellipta 62.5-25 Mcg Inh  IH   1 puff





  DAILY MITCH   Administration





     





     





     





     








 Home Medications











 Medication  Instructions  Recorded


 


Acetaminophen 650 mg PO QID PRN 12/17/19


 


Albuterol Sulfate Inhaler - 1 - 2 inh PO Q4H 12/17/19





[Ventolin Hfa Inhaler -]  


 


Albuterol Sulfate Inhaler - 1 puff IH QID 12/17/19





[Ventolin Hfa Inhaler -]  


 


Amlodipine Besylate 5 mg PO DAILY 12/17/19


 


Aspirin 81 mg PO DAILY 12/17/19


 


Calcitriol [Rocaltrol -] 0.25 mcg PO Q2D 12/17/19


 


Carvedilol [Coreg -] 12.5 mg PO BID 12/17/19


 


Fluoxetine HCl 60 mg PO DAILY 12/17/19


 


Lamotrigine [Lamictal] 200 mg PO DAILY 12/17/19


 


Olanzapine [Zyprexa -] 5 mg PO DAILY 12/17/19


 


Rosuvastatin Calcium [Crestor] 10 mg PO DAILY 12/17/19


 


Sodium Chloride [Nasadrops] 15 ml NS DAILY 12/17/19


 


Umeclidinium Brm/Vilanterol Tr 1 each IH DAILY 12/17/19





[Anoro Ellipta 62.5-25 Mcg INH]  














 


 





ASSESSMENT AND PLAN:





74 year old female resident at [facility], HTN, COPD, Bipolar, 

hearing impairment, brought to ED after being found on the floor, poor historian

, unable to recount ravinder-collapse events/symptoms, complains of R hip/leg pain. 





CT of the head  - showed a mild right frontal scalp edema without skull 

fracture or intracranial hemorrhage





CT of cervical spine  - is negative for fracture or  prevertebral soft tissue 

swelling, degenerative changes in C2-C4





CXR - no acute findings.





Hip/Pelvis Xray - sclerotic density R intertrochanteric area





1. Syncope vs Fall


ECG - NSR, QTc 515 ---> 491


Echo - LV EF Normal, severe MR.


Carotid Duplex - shows some narrowing of carotids - evaluated by Vascular Sx - 

no need for intervention as narrowing < 50%.


Orthostatic Vitals negative


PT recommends Rehab, scheduled for discharge to [facility] today.





2. Sclerotic density R intertrochanteric area on XRay - not visualized on CT 

Pelvis/LEs. 





3. GLORIA on CKD 3 - Creat improving, at baseline currently.


Renal US - Atrophic echogenic kidneys, L Renal cyst.





4. Hepatic Dysfunction (elevated AST, GGT)


Abdominal US - fatty liver. Hepatitis panel negative. 





5. HTN - continue Norvasc, Coreg.





6. COPD - Stable, no evidence of decompensation. On AnoroEllipta, Albuterol PRN.





7. Bipolar Disorder - Continue Olanzapine, Lamotrigine, Fluoxetine. Monthly ECG 

for QTc check as per Cardio. 





8. HLD - Statin dose decreased due to elevated LFTs.





9. Macrocytic Anemia - etiology unclear. B12/folate non-deficient. ?sec to 

Liver disease +/- CKD. Iron 62/Ferritin 224/FOBT negative. 


Hematology consulted - recommend further out-patient work-up with Flow Cytometry

/FISH.





10. Severe MR - evaluated by Cardiology - for out-patient follow up.





Medicaly optimized for discharge with Cardio, Hematlogy, Nephrology follow ups.

## 2019-12-22 VITALS — SYSTOLIC BLOOD PRESSURE: 128 MMHG | DIASTOLIC BLOOD PRESSURE: 54 MMHG | HEART RATE: 64 BPM | TEMPERATURE: 98 F

## 2019-12-22 RX ADMIN — UMECLIDINIUM BROMIDE AND VILANTEROL TRIFENATATE SCH PUFF: 62.5; 25 POWDER RESPIRATORY (INHALATION) at 09:44

## 2019-12-22 RX ADMIN — AMLODIPINE BESYLATE SCH MG: 5 TABLET ORAL at 09:48

## 2019-12-22 RX ADMIN — FLUOXETINE HYDROCHLORIDE SCH MG: 20 CAPSULE ORAL at 09:47

## 2019-12-22 RX ADMIN — HEPARIN SODIUM SCH UNIT: 5000 INJECTION, SOLUTION INTRAVENOUS; SUBCUTANEOUS at 09:47

## 2019-12-22 RX ADMIN — ASPIRIN 81 MG SCH MG: 81 TABLET ORAL at 09:47

## 2019-12-22 RX ADMIN — CARVEDILOL SCH MG: 12.5 TABLET, FILM COATED ORAL at 09:47

## 2019-12-22 RX ADMIN — CALCITRIOL SCH MCG: 0.25 CAPSULE ORAL at 09:47

## 2019-12-22 NOTE — DS
Physical Exam: 


SUBJECTIVE: Patient seen and examined, no complaints, agreable to go to SNF 

today. Pleasant and co-operative








OBJECTIVE:





 Vital Signs











 Period  Temp  Pulse  Resp  BP Sys/Caceres  Pulse Ox


 


 Last 24 Hr  97.8 F-98 F  68-72  17-20  139-159/51-72  90-96








PHYSICAL EXAM





GENERAL: sitting in wheelchair, no acute distress


HEAD: Normal with no signs of trauma.


EYES: PERRL, extraocular movements intact, sclera anicteric, conjunctiva clear. 


ENT: Ears normal, nares patent, oropharynx clear without exudates, moist mucous 

membranes.


NECK: Trachea midline, full range of motion, supple. 


LUNGS: CTAB, no rales or wheezing


HEART: Regular rate and rhythm, S1, S2 , left parasternal systolic murmur


ABDOMEN: Soft, nontender, nondistended, normoactive bowel sounds, no guarding, 

no rebound, 


EXTREMITIES: 2+ pulses, warm, well-perfused, no edema. 


NEUROLOGICAL: AAOx3, facial symmetry, moves all extremities, normal speech, 

gait not observed


PSYCH: Normal mood, normal affect.


SKIN: Warm, dry, normal turgor, no rashes or lesions noted.





LABS


 Laboratory Results - last 24 hr











  12/21/19





  07:15


 


Haptoglobin  153





 Laboratory Last Values











WBC  6.7 K/mm3 (4.0-10.0)   12/21/19  07:15    


 


RBC  2.16 M/mm3 (3.60-5.2)  L  12/21/19  07:15    


 


Hgb  7.3 GM/dL (10.7-15.3)  L  12/21/19  07:15    


 


Hct  22.2 % (32.4-45.2)  L  12/21/19  07:15    


 


MCV  102.8 fl (80-96)  H  12/21/19  07:15    


 


MCH  33.7 pg (25.7-33.7)   12/21/19  07:15    


 


MCHC  32.8 g/dl (32.0-36.0)   12/21/19  07:15    


 


RDW  13.9 % (11.6-15.6)   12/21/19  07:15    


 


Plt Count  174 K/MM3 (134-434)   12/21/19  07:15    


 


MPV  10.3 fl (7.5-11.1)   12/21/19  07:15    


 


Absolute Neuts (auto)  5.5 K/mm3 (1.5-8.0)   12/19/19  12:20    


 


Neutrophils %  76.8 % (42.8-82.8)   12/19/19  12:20    


 


Lymphocytes %  13.2 % (8-40)  D 12/19/19  12:20    


 


Monocytes %  9.3 % (3.8-10.2)   12/19/19  12:20    


 


Eosinophils %  0.6 % (0-4.5)   12/19/19  12:20    


 


Basophils %  0.1 % (0-2.0)   12/19/19  12:20    


 


Nucleated RBC %  0 % (0-0)   12/19/19  12:20    


 


Retic Count  3.33 % (0.5-1.5)  H  12/18/19  06:44    


 


Haptoglobin  153 mg/dL ()   12/21/19  07:15    


 


PT with INR  12.40 SEC (9.7-13.0)   12/17/19  23:39    


 


INR  1.05  (0.83-1.09)   12/17/19  23:39    


 


PTT (Actin FS)  35.3 SECONDS (25.2-36.5)   12/17/19  23:39    


 


Sodium  139 mmol/L (136-145)   12/21/19  07:15    


 


Potassium  4.9 mmol/L (3.5-5.1)   12/21/19  07:15    


 


Chloride  113 mmol/L ()  H  12/21/19  07:15    


 


Carbon Dioxide  17 mmol/L (21-32)  L  12/21/19  07:15    


 


Anion Gap  9 MMOL/L (8-16)   12/21/19  07:15    


 


BUN  24.1 mg/dL (7-18)  H  12/21/19  07:15    


 


Creatinine  2.7 mg/dL (0.55-1.3)  H  12/21/19  07:15    


 


Est GFR (CKD-EPI)AfAm  19.21 12/21/19  07:15    


 


Est GFR (CKD-EPI)NonAf  16.57   12/21/19  07:15    


 


Random Glucose  115 mg/dL ()  H  12/21/19  07:15    


 


Calcium  9.3 mg/dL (8.5-10.1)   12/21/19  07:15    


 


Phosphorus  3.0 mg/dL (2.5-4.9)   12/18/19  06:44    


 


Magnesium  2.3 mg/dL (1.8-2.4)   12/19/19  06:35    


 


Iron  62 ug/dL ()   12/18/19  06:44    


 


TIBC  188 ug/dL (250-450)  L  12/18/19  06:44    


 


Iron Saturation  32 % (17.5-39)   12/18/19  06:44    


 


Unsaturated IBC  126 ug/dL (200-275)  L  12/18/19  06:44    


 


Ferritin  224.4 ng/ml (8-388)   12/18/19  06:44    


 


Total Bilirubin  1.1 mg/dL (0.2-1)  H  12/21/19  07:15    


 


GGT  514 U/L (5-85)  H  12/18/19  06:44    


 


AST  89 U/L (15-37)  H  12/21/19  07:15    


 


ALT  40 U/L (13-61)   12/21/19  07:15    


 


Alkaline Phosphatase  180 U/L ()  H  12/21/19  07:15    


 


LD Total  150 U/L ()   12/21/19  07:15    


 


Creatine Kinase  232 U/L ()  H  12/17/19  23:39    


 


Creatine Kinase Index  1.3 % (0.0-5.0)   12/17/19  23:39    


 


CK-MB (CK-2)  3.1 ng/mL (0.5-3.6)   12/17/19  23:39    


 


Troponin I  < 0.02 ng/ml (0.00-0.05)   12/17/19  23:39    


 


Total Protein  5.7 g/dl (6.4-8.2)  L  12/21/19  07:15    


 


Albumin  2.4 g/dl (3.4-5.0)  L  12/21/19  07:15    


 


Vitamin B12  832 pg/ml (193-986)   12/18/19  06:44    


 


Serum Folate  6 ng/mL (3.1-17.5)   12/18/19  06:44    


 


TSH  6.56 uIU/ml (0.358-3.74)  H  12/18/19  06:44    


 


Free T4  1.33 ng/dl (0.76-1.46)   12/18/19  06:44    


 


Urine Color  Yellow   12/18/19  01:25    


 


Urine Appearance  Clear   12/18/19  01:25    


 


Urine pH  5.5  (5.0-8.0)   12/18/19  01:25    


 


Ur Specific Gravity  1.015  (1.010-1.035)   12/18/19  01:25    


 


Urine Protein  1+  (NEGATIVE)  H  12/18/19  01:25    


 


Urine Glucose (UA)  Negative  (NEGATIVE)   12/18/19  01:25    


 


Urine Ketones  Negative  (NEGATIVE)   12/18/19  01:25    


 


Urine Blood  1+  (NEGATIVE)  H  12/18/19  01:25    


 


Urine Nitrite  Negative  (NEGATIVE)   12/18/19  01:25    


 


Urine Bilirubin  Negative  (NEGATIVE)   12/18/19  01:25    


 


Urine Urobilinogen  1.0 mg/dL (0.2-1.0)   12/18/19  01:25    


 


Ur Leukocyte Esterase  Negative  (NEGATIVE)   12/18/19  01:25    


 


Urine WBC (Auto)  2 /hpf (0-5)   12/18/19  01:25    


 


Urine RBC (Auto)  1 /hpf (0-4)   12/18/19  01:25    


 


Urine Casts (Auto)  6 /lpf (0-8)   12/18/19  01:25    


 


U Epithel Cells (Auto)  5.3 /HPF (0-5/HPF)   12/18/19  01:25    


 


U Sm Round Cell (Auto)  None   12/18/19  01:25    


 


Urine Bacteria (Auto)  0.5 /hpf (NEGATIVE)   12/18/19  01:25    


 


Stool Occult Blood  Negative  (NEGATIVE)   12/18/19  01:19    


 


Hep A IgM Ab Confirm  Negative  (Negative)   12/18/19  07:40    


 


Hepatitis A Ab Total  Negative  (Negative)   12/18/19  07:40    


 


Hep Bs Antigen  Negative  (Negative)   12/18/19  07:40    


 


Hep Bs Antibody  Non reactive  (.)   12/18/19  07:40    


 


Hep B Core Total Ab  Negative  (Negative)   12/18/19  07:40    


 


Hep B Core IgM Ab  Negative  (Negative)   12/18/19  07:40    


 


Hepatitis Be Antibody  Negative  (Negative)   12/18/19  07:40    


 


Hepatitis Be Antigen  Negative  (Negative)   12/18/19  07:40    


 


Blood Type  O POSITIVE   12/18/19  06:44    


 


Antibody Screen  Negative   12/17/19  23:39    














HOSPITAL COURSE:





Date of Admission:12/18/19





Date of Discharge: 12/22/19











Minutes to complete discharge: 45





Discharge Summary


Problems reviewed: Yes


Reason For Visit: SYNCOPE


Current Active Problems





Back pain (Acute)


Bipolar 1 disorder (Acute)


CKD (chronic kidney disease) (Acute)


HTN (hypertension) (Acute)








Hospital Course: 


75 y/o/f with PMHx of HTN, COPD, bipolar disorder, hearing loss who presented 

to the ED from [facility] and assisted living [facility] [place] 

pain, R hip pain and R thigh pain s/p fall with evidence of right frontal scalp 

edema. Patient has had multiple falls over the last two weeks as per assisted 

living facility. Patient had imaging done including CT scan head, CT cervical 

spine, Hep/Pelvis X-rays, CT Pelvis, CT lower extremity which was all negative 

for fracture, masses, or bleeding. Patient was worked up for syncope vs. fall. 

Carotid U/S showed <50% stenosis of carotids but patient was evaluated by 

vascular surgery and there was no need for intervention at this time. 

Orthostatic vitals were negative. On ECHO patient was found to have severe 

Mitral Regurg. On initial EKG patient had a QTc of 515 which improved to 491 on 

repeat EKG. However, Cardio recommended outpatient follow up for the Mitral 

valve regurg and for serial EKG monitoring 1-2 times a month as patient is on 

QTc prolonging medications. Patient had elevated kidney function on admission 

without any known history of CKD. Kidney ultrasound showed echogenic and 

atrophic kidneys suggestive of chronic kidney disease. Nephrology was consulted 

and recommended outpatient follow up to evaluate baseline Cr. On labs patient 

was also found to have macrocytic anemia of unclear etiology (B12, folate normal

). Heme onc was consulted and recommended further workup outpatient. She was 

noted with elevated TSH and will need outpatient monitoring. Patient's home 

medications were continued while in the hospital. Follow up was given for 

Cardiology, Heme/Onc, and nephrology. Patient stable for discharge to SNF.  


Condition: Stable





- Instructions


Diet, Activity, Other Instructions: 


You presented to the hospital after being found on the floor after an 

unwitnessed fall. You had imaging done of your head, cervical spine, hip, and 

pelvis which showed no fractures or bleeding. You had an cardiac ultrasound 

done which showed severe mitral valve regurgitation for which Cardiology 

recommends outpatient follow up for a repeat ultrasound. You also had an EKG 

done which showed a prolonged QTc for which cardiology recommended repeat EKGs 

every 1-2 months for monitoring. Your kidney function was also found to be 

elevated on admission, you were seen by Nephrology who recommended outpatient 

follow up. You were found to have anemia during this admission and were seen by 

Heme/Onc who recommended outpatient follow up as well. Your home medications 

were continued while in the hospital. 





Medication changes:


1. Your Rosuvastatin dose was decreased to 5mg daily. 





Follow up with the following physicians:


1. Please follow up with your primary care provider within one week of 

discharge as you will need repeat labs of your liver.


2. Follow up with Hematology/Oncology, Dr. Goldberg, within one week of discharge 

for further evaluation of your anemia. 


3. Follow up with Dr. Barrios, Nephrology, for your kidney disease and follow 

up labs.


4. Follow up with Dr. Mills, Cardiology, for further evaluation of your 

mitral valve regurgitation and EKG monitoring.  


You will need serial EKG monitoring 1-2 times a month with your doctor and 

adjustment of your psychiatric medications accordingly. 


5. With your psychiatrist to adjust your medications based on EKG readings (and 

"Qtc interval")


6. Vascular surgery Follow up Dr. Cassidy (to monitor carotid stenosis), Regular 

carotid Ultrasound screening





FOLLOW UP BLOOD TESTS:


CBC, BMP in 1 week


Thyroid function tests in 2-3 weeks


EKG 1-2 times a month


Regular screening carotid Ultrasound to monitor narrowing 





PENDING BLOOD TESTS:


Immune electrophoresis (To be followed with Dr. Barrios on next visit in 1-2 

weeks)


Flow cytometry (to be followed with Dr. Goldberg in 1-2 weeks on next visit)





Activity and Diet


1. You are being discharged to a rehab facility to strengthen your muscles.


2. Please continue to monitor your diet as you need to intake less salt and 

drink plenty of fluids.


3. Please do not use NSAIDs (Ibuprofen, Advil, Motrin etc.)





Continue all your other medications as prescribed





Please return to the ER if you have any signs or symptoms of chest pain, 

shortness of breath, uncontrollable fever, chills, nausea, vomiting, numbness, 

tingling, or weakness in any part of your body, changes in vision, or slurred 

speech.





Please return to the ER if symptoms persist, worsen, or new symptoms arise.


Referrals: 


Santhosh Mills MD [Staff Physician] - 


Gio Cassidy DO [Staff Physician] - 


Jay Ford MD [Primary Care Provider] - 


Romeo Goldberg MD [Staff Physician] - 


Alexa Barrios MD [Staff Physician] - 


Disposition: SKILLED NURSING FACILITY





- Home Medications


Comprehensive Discharge Medication List: 


Ambulatory Orders





Albuterol Sulfate Inhaler - [Ventolin HFA Inhaler -] 1 puff IH QID 12/17/19 


Amlodipine Besylate 5 mg PO DAILY 12/17/19 


Aspirin 81 mg PO DAILY 12/17/19 


Calcitriol [Calcitriol -] 0.25 mcg PO Q2D 12/17/19 


Carvedilol [Coreg -] 12.5 mg PO BID 12/17/19 


Fluoxetine HCl 60 mg PO DAILY 12/17/19 


Lamotrigine [Lamictal] 200 mg PO DAILY 12/17/19 


Olanzapine [Zyprexa -] 5 mg PO DAILY 12/17/19 


Sodium Chloride [Nasadrops] 15 ml NS DAILY 12/17/19 


Umeclidinium Brm/Vilanterol Tr [Anoro Ellipta 62.5-25 Mcg INH] 1 each IH DAILY 

12/17/19 


Rosuvastatin [Crestor -] 5 mg PO HS #30 tablet 12/21/19 








This patient is new to me today: Yes


Date on this admission: 12/22/19


Emergency Visit: Yes


ED Registration Date: 12/18/19


Care time: The patient presented to the Emergency Department on the above date 

and was hospitalized for further evaluation of their emergent condition.


Critical Care patient: No





- Discharge Referral


Referred to [facility] No

## 2019-12-22 NOTE — PN
Progress Note, Physician


Chief Complaint: 





sob


History of Present Illness: 





continues to feel mild sob.


? when started.


quit cigs 2 mo ago.


denies wheezing





no cp, palp, leg swelling





ex cigs





- Current Medication List


Current Medications: 


Active Medications





Albuterol Sulfate (Ventolin Hfa Inhaler -)  2 puff IH Q4H PRN


   PRN Reason: SHORTNESS OF BREATH


Amlodipine Besylate (Norvasc -)  5 mg PO DAILY [facility]


   Last Admin: 12/21/19 09:40 Dose:  5 mg


Aspirin (Asa -)  81 mg PO DAILY [facility]


   Last Admin: 12/21/19 09:40 Dose:  81 mg


Calcitriol (Rocaltrol -)  0.25 mcg PO Q2D [facility]


   Last Admin: 12/20/19 12:00 Dose:  0.25 mcg


Carvedilol (Coreg -)  12.5 mg PO BID [facility]


   Last Admin: 12/21/19 21:30 Dose:  12.5 mg


Fluoxetine HCl (Prozac -)  60 mg PO DAILY [facility]


   Last Admin: 12/21/19 09:39 Dose:  60 mg


Heparin Sodium (Porcine) (Heparin -)  5,000 unit SQ BID [facility]


   Last Admin: 12/21/19 21:29 Dose:  5,000 unit


Lamotrigine (Lamictal -)  200 mg PO DAILY [facility]


   Last Admin: 12/21/19 09:43 Dose:  200 mg


Olanzapine (Zyprexa -)  5 mg PO DAILY [facility]


   Last Admin: 12/21/19 09:42 Dose:  5 mg


Rosuvastatin Calcium (Crestor -)  5 mg PO HS [facility]


   Last Admin: 12/21/19 21:29 Dose:  5 mg


Umeclidinium/Vilanterol (Anoro Ellipta 62.5-25 Mcg Inh)  1 puff IH DAILY [facility]


   Last Admin: 12/21/19 09:38 Dose:  1 puff











- Objective


Vital Signs: 


 Vital Signs











Temperature  97.8 F   12/22/19 05:00


 


Pulse Rate  72   12/22/19 05:00


 


Respiratory Rate  17   12/22/19 05:00


 


Blood Pressure  147/51 L  12/22/19 05:00


 


O2 Sat by Pulse Oximetry (%)  90 L  12/21/19 21:00











Constitutional: Yes: No Distress, Calm


Eyes: No: Sclera Icterus


HENT: No: Nasal Congestion


Cardiovascular: Yes: Regular Rate and Rhythm, S1, S2, Other (PMI non diplaced).

  No: Gallop, Murmur


Respiratory: Yes: CTA Bilaterally.  No: Accessory Muscle Use, Rales, Wheezes


Gastrointestinal: Yes: Normal Bowel Sounds, Soft.  No: Tenderness


Musculoskeletal: Yes: Other (No kyphosis)


Extremities: No: Cold, Cyanosis


Edema: No


Integumentary: No: Jaundice


Neurological: Yes: Alert, Oriented (x3)


Psychiatric: No: Agitated


Labs: 


 CBC, BMP





 12/21/19 07:15 





 12/21/19 07:15 





 INR, PTT











INR  1.05  (0.83-1.09)   12/17/19  23:39    














Assessment/Plan





echo 12/2019 tds, nl LV function,mod to severe MR, mod MAC, RWMA cannot be 

excluded, RV nl





ecg 12/21: NSR, normal axis, QTc (manually calculated) = 490 msec. (no isch 

changes)





cxr: prominent central markings





tele: sinus, artifact

















fall, syncope


- pt states she rolled out of bed to floor while asleep, however cannot provide 

details or recollection of sequence of events once on the floor. sustained 

ecchymosis R forehead


- no events on tele here


- carotid stenosis on doppler, <50% - no intervention per vascular


- mild drop on orthostatics (9 mmHg)


- trop neg, no ischemic changes on EKG





prolonged QTc


- initially 515 ms, now improved 490 range (as above)


- avoid QT prolonging agents


- maintain K>4, Mg >2


- given uncertainty of history (cannot confirm pt report that she rolled out of 

bed in her sleep), would rec outpatient 28 day patch monitor if tele benign 

while here


- both fluoxetine and olanzapine can prolong QT (amee in combination). rec 

continued monitoring of ecg--if QT remains <500 and psych believes she is at 

significant risk of decompensation if meds are changed, then reasonable to 

continue these with frequent routine ECG monitoring as outpatient. no firm 

guidelines--reasonable recommendation would be ECG q1-2 months. if QT prolongs 

to 500+ in future, will need to alter her regimen.





sob, wheezing:


- appears euvolemic, cxr underwhelming


- suspect bronchospasm--cont prn nebs for now as doing





mitral regurgitation


- moderate to severe MR on echo


- appears euvolemic


- outpatient follow up and repeat echo with MR quantification will be needed in 

our office, +/- ? valve center





CKD


- per pt had dialysis access placed in the past, but no history of HD


- ? baseline creat


- plan per renal


 


HTN


- stable on current meds, continue





bipolar disorder


- manage per primary





anemia


- etiology uncertain, heme is evaluating





OK FOR D/C FROM [facility].

## 2019-12-23 LAB
IGA SER-ACNC: 583 MG/DL (ref 64–422)
IGG SERPL-MCNC: 882 MG/DL (ref 700–1600)
IGM SERPL-MCNC: 184 MG/DL (ref 26–217)

## 2019-12-24 LAB — KAPPA LC FREE SER-MCNC: 113.5 MG/L (ref 3.3–19.4)

## 2024-02-16 NOTE — CONSULT
Called patient, no answer.  Per [employer] permission granted to leave detailed message on voice identified answering device.    Writer also mentioned on message that result note also being sent via patients [employer] patient portal for her review.   Consultation: 





Heme /Oncology Consult 





REQUESTING PROVIDER: Dr Joshua Momin 





CONSULT REQUEST: We have been asked to medically evaluate this patient for (

Macrocytic anemia ).





HISTORY OF PRESENT ILLNESS:


Pt is uncooperative and only moans to pain on palpation. History is as per ED 

notes


73 y/o female with a significant past medical history of  HTN, COPD, bipolar, 

hearing loss, who presents to the ED from [facility] for back 

pain, R hip pain and R thigh pain s/p fall. As per NH patient was found on the 

floor by staff. Pt has fallen 3x in the past week. The patient is a poor 

historian and unable to contribute to history





Recent Travel: unobtainable





PAST MEDICAL HISTORY: as above





PAST SURGICAL HISTORY: unobtainable





Social History:unobtainable








REVIEW OF SYSTEMS: right hip pain 





PHYSICAL EXAMINATION





 Vital Signs - 24 hr











  12/18/19 12/18/19 12/18/19





  16:54 16:55 17:30


 


Temperature   99.5 F


 


Pulse Rate  68 63


 


Pulse Rate [  68 





Left side   





Sitting]   


 


Pulse Rate [ 69  





Left side   





Supine]   


 


Respiratory  18 20





Rate   


 


Blood Pressure  150/74 142/62


 


Blood Pressure  150/74 





[Left side   





Sitting]   


 


Blood Pressure 156/70  





[Left side   





Supine]   


 


O2 Sat by Pulse   96





Oximetry (%)   














  12/18/19 12/18/19 12/19/19





  20:05 21:00 02:00


 


Temperature 98.5 F  98.7 F


 


Pulse Rate 65  62


 


Pulse Rate [   





Left side   





Sitting]   


 


Pulse Rate [   





Left side   





Supine]   


 


Respiratory 20  20





Rate   


 


Blood Pressure 142/40 L  122/49 L


 


Blood Pressure   





[Left side   





Sitting]   


 


Blood Pressure   





[Left side   





Supine]   


 


O2 Sat by Pulse  96 





Oximetry (%)   














  12/19/19





  06:00


 


Temperature 98.5 F


 


Pulse Rate 67


 


Pulse Rate [ 





Left side 





Sitting] 


 


Pulse Rate [ 





Left side 





Supine] 


 


Respiratory 20





Rate 


 


Blood Pressure 152/57 L


 


Blood Pressure 





[Left side 





Sitting] 


 


Blood Pressure 





[Left side 





Supine] 


 


O2 Sat by Pulse 





Oximetry (%) 














HEENT -NC/AT 


Heart - S1, S2 soft SM


Lungs -CTA B/L 


Abdomen - soft, non-tender. Bowel Sounds normal.


Extremities - No edema, no calf tenderness.right hip tenderness , LROM due to 

pain on right leg 


Neuro - AAO x 3. Tone/Power normal all extremities.





 








 Laboratory Results - last 24 hr











  12/18/19 12/18/19 12/19/19





  06:44 07:40 06:35


 


WBC    5.8


 


RBC    2.08 L


 


Hgb    7.1 L


 


Hct    21.0 L


 


MCV    100.6 H


 


MCH    34.3 H


 


MCHC    34.1


 


RDW    13.5


 


Plt Count    161


 


MPV    10.0


 


Sodium  136  


 


Potassium  4.9  


 


Chloride  106  


 


Carbon Dioxide  21  


 


Anion Gap  9  


 


BUN  29.2 H  


 


Creatinine  3.4 H  


 


Est GFR (CKD-EPI)AfAm  14.53  


 


Est GFR (CKD-EPI)NonAf  12.54  


 


Random Glucose  104  


 


Calcium  9.7  


 


Phosphorus  3.0  


 


Magnesium  2.6 H  


 


Total Bilirubin  0.8  


 


AST  81 H  


 


ALT  39  


 


Alkaline Phosphatase  222 H  


 


Total Protein  6.2 L  


 


Albumin  2.8 L  


 


Serum Folate  6  


 


TSH  6.56 H  


 


Free T4  1.33  


 


Hep B Core IgM Ab   Negative 


 


Hepatitis Be Antigen   Negative 














  12/19/19





  06:35


 


WBC 


 


RBC 


 


Hgb 


 


Hct 


 


MCV 


 


MCH 


 


MCHC 


 


RDW 


 


Plt Count 


 


MPV 


 


Sodium  138


 


Potassium  4.5


 


Chloride  111 H


 


Carbon Dioxide  18 L


 


Anion Gap  8


 


BUN  28.4 H


 


Creatinine  3.0 H


 


Est GFR (CKD-EPI)AfAm  16.91


 


Est GFR (CKD-EPI)NonAf  14.59


 


Random Glucose  74


 


Calcium  8.9


 


Phosphorus 


 


Magnesium  2.3


 


Total Bilirubin  0.9


 


AST  82 H


 


ALT  35


 


Alkaline Phosphatase  196 H


 


Total Protein  5.4 L


 


Albumin  2.4 L


 


Serum Folate 


 


TSH 


 


Free T4 


 


Hep B Core IgM Ab 


 


Hepatitis Be Antigen 








Active Medications











Generic Name Dose Route Start Last Admin





  Trade Name Freq  PRN Reason Stop Dose Admin


 


Albuterol Sulfate  2 puff  12/18/19 16:15  





  Ventolin Hfa Inhaler -  IH   





  Q4H PRN   





  SHORTNESS OF BREATH   





     





     





     


 


Amlodipine Besylate  5 mg  12/18/19 16:15  12/19/19 10:14





  Norvasc -  PO   5 mg





  DAILY MITCH   Administration





     





     





     





     


 


Aspirin  81 mg  12/19/19 10:00  12/19/19 10:15





  Asa -  PO   81 mg





  DAILY MITCH   Administration





     





     





     





     


 


Calcitriol  0.25 mcg  12/20/19 10:00  





  Rocaltrol -  PO   





  Q2D MITCH   





     





     





     





     


 


Carvedilol  12.5 mg  12/18/19 22:00  12/19/19 10:14





  Coreg -  PO   12.5 mg





  BID MITCH   Administration





     





     





     





     


 


Fluoxetine HCl  60 mg  12/19/19 10:00  12/19/19 10:15





  Prozac -  PO   60 mg





  DAILY MITCH   Administration





     





     





     





     


 


Heparin Sodium (Porcine)  5,000 unit  12/18/19 13:15  12/18/19 22:23





  Heparin -  SQ   5,000 unit





  BID MITCH   Administration





     





     





     





     


 


Sodium Chloride  1,000 mls @ 100 mls/hr  12/18/19 04:45  12/19/19 06:00





  Normal Saline -  IV   100 mls/hr





  ASDIR MITCH   Administration





     





     





     





     


 


Lamotrigine  200 mg  12/19/19 10:00  





  Lamictal -  PO   





  DAILY MITCH   





     





     





     





     


 


Olanzapine  5 mg  12/19/19 10:00  





  Zyprexa -  PO   





  DAILY MITCH   





     





     





     





     


 


Rosuvastatin Calcium  5 mg  12/19/19 22:00  





  Crestor -  PO   





  HS MITCH   





     





     





     





     


 


Umeclidinium/Vilanterol  1 puff  12/19/19 10:00  





  Anoro Ellipta 62.5-25 Mcg Inh  IH   





  DAILY MITCH   





     





     





     





     





 CBC, BMP





 12/19/19 06:35 





 12/19/19 06:35 











ASSESSMENT/PLAN:








# Anemia likely due to CKD 


# Macrocytosis  R.O MDS order flow cytometry and fish


mixed picture  likely due to medication SE Fluxetine  r vs hypothyroidism vs 

anemia of CKD can nor R.O alcoholic history 


* .6, MCHC 34


* FA and B12 within normal limits 


* Iron studies with ferritin 224, iron 62, TIBC 188, 


* occult blood negative , will benift from colonoscopy 


* reticulocytes elevated 3.33 might reflect hemolytic anemia 


* monitor renal function 


* avoid nephrotoxic agents 


# transaminities AST 81 , ALT 35 likely meds SE olanzapin and lamotrogin , 

monitor labs , US with fatty liver 


# Elevated TSH , repeat out pt after acute phase , as hypothyroidism might be 

in the differential 


# Syncope due to dehydration vs olanzapine SE , fall precautions , 


Dispo: We will continue to follow the patient. Thank you for this consultative 

opportunity.











Visit type





- Emergency Visit


Emergency Visit: Yes


ED Registration Date: 12/18/19


Care time: The patient presented to the Emergency Department on the above date 

and was hospitalized for further evaluation of their emergent condition.





- New Patient


This patient is new to me today: Yes


Date on this admission: 12/19/19





- Critical Care


Critical Care patient: No





ATTENDING PHYSICIAN STATEMENT





I saw and evaluated the patient.


I reviewed the resident's note and discussed the case with the resident.


I agree with the resident's findings and plan as documented.








SUBJECTIVE:








OBJECTIVE:








ASSESSMENT AND PLAN: